# Patient Record
Sex: MALE | Race: OTHER | HISPANIC OR LATINO | Employment: OTHER | URBAN - METROPOLITAN AREA
[De-identification: names, ages, dates, MRNs, and addresses within clinical notes are randomized per-mention and may not be internally consistent; named-entity substitution may affect disease eponyms.]

---

## 2019-11-12 PROBLEM — E78.5 DYSLIPIDEMIA: Status: ACTIVE | Noted: 2019-11-12

## 2019-11-12 PROBLEM — J41.0 SMOKERS' COUGH: Status: ACTIVE | Noted: 2019-11-12

## 2019-11-12 PROBLEM — Z79.4 LONG-TERM INSULIN USE: Status: ACTIVE | Noted: 2019-11-12

## 2019-11-12 PROBLEM — K21.9 GASTROESOPHAGEAL REFLUX DISEASE WITHOUT ESOPHAGITIS: Status: ACTIVE | Noted: 2019-11-12

## 2019-11-12 PROBLEM — E11.42 DIABETIC POLYNEUROPATHY ASSOCIATED WITH TYPE 2 DIABETES MELLITUS: Status: ACTIVE | Noted: 2019-11-12

## 2019-11-12 PROBLEM — Z86.73 HISTORY OF CVA (CEREBROVASCULAR ACCIDENT): Status: ACTIVE | Noted: 2019-11-12

## 2019-12-04 ENCOUNTER — OFFICE VISIT (OUTPATIENT)
Dept: FAMILY MEDICINE | Facility: CLINIC | Age: 69
End: 2019-12-04
Payer: MEDICARE

## 2019-12-04 VITALS
HEART RATE: 68 BPM | WEIGHT: 196 LBS | DIASTOLIC BLOOD PRESSURE: 66 MMHG | BODY MASS INDEX: 28.06 KG/M2 | SYSTOLIC BLOOD PRESSURE: 134 MMHG | HEIGHT: 70 IN

## 2019-12-04 DIAGNOSIS — Z79.4 LONG-TERM INSULIN USE: ICD-10-CM

## 2019-12-04 DIAGNOSIS — E78.5 DYSLIPIDEMIA: ICD-10-CM

## 2019-12-04 DIAGNOSIS — K21.9 GASTROESOPHAGEAL REFLUX DISEASE WITHOUT ESOPHAGITIS: ICD-10-CM

## 2019-12-04 DIAGNOSIS — I10 ESSENTIAL HYPERTENSION: ICD-10-CM

## 2019-12-04 DIAGNOSIS — F17.210 CIGARETTE SMOKER: ICD-10-CM

## 2019-12-04 DIAGNOSIS — E11.42 DIABETIC POLYNEUROPATHY ASSOCIATED WITH TYPE 2 DIABETES MELLITUS: Primary | ICD-10-CM

## 2019-12-04 DIAGNOSIS — Z91.199 NONCOMPLIANCE: ICD-10-CM

## 2019-12-04 PROCEDURE — 99214 OFFICE O/P EST MOD 30 MIN: CPT | Mod: S$GLB,,, | Performed by: NURSE PRACTITIONER

## 2019-12-04 PROCEDURE — 1101F PR PT FALLS ASSESS DOC 0-1 FALLS W/OUT INJ PAST YR: ICD-10-PCS | Mod: S$GLB,,, | Performed by: NURSE PRACTITIONER

## 2019-12-04 PROCEDURE — 1159F MED LIST DOCD IN RCRD: CPT | Mod: S$GLB,,, | Performed by: NURSE PRACTITIONER

## 2019-12-04 PROCEDURE — 1101F PT FALLS ASSESS-DOCD LE1/YR: CPT | Mod: S$GLB,,, | Performed by: NURSE PRACTITIONER

## 2019-12-04 PROCEDURE — 1159F PR MEDICATION LIST DOCUMENTED IN MEDICAL RECORD: ICD-10-PCS | Mod: S$GLB,,, | Performed by: NURSE PRACTITIONER

## 2019-12-04 PROCEDURE — 99214 PR OFFICE/OUTPT VISIT, EST, LEVL IV, 30-39 MIN: ICD-10-PCS | Mod: S$GLB,,, | Performed by: NURSE PRACTITIONER

## 2019-12-04 RX ORDER — SIMVASTATIN 40 MG/1
1 TABLET, FILM COATED ORAL DAILY
Refills: 1 | COMMUNITY
Start: 2019-09-11 | End: 2019-12-04 | Stop reason: SDUPTHER

## 2019-12-04 RX ORDER — BLOOD-GLUCOSE CONTROL, NORMAL
EACH MISCELLANEOUS
Status: ON HOLD | COMMUNITY
Start: 2018-01-09 | End: 2023-01-24 | Stop reason: HOSPADM

## 2019-12-04 RX ORDER — INSULIN GLARGINE 100 [IU]/ML
10 INJECTION, SOLUTION SUBCUTANEOUS DAILY
COMMUNITY
Start: 2018-01-16 | End: 2019-12-04 | Stop reason: SDUPTHER

## 2019-12-04 RX ORDER — GLIMEPIRIDE 2 MG/1
1 TABLET ORAL DAILY
Refills: 1 | COMMUNITY
Start: 2019-09-21 | End: 2019-12-04 | Stop reason: SDUPTHER

## 2019-12-04 RX ORDER — INSULIN GLARGINE 100 [IU]/ML
10 INJECTION, SOLUTION SUBCUTANEOUS NIGHTLY
Qty: 1 BOX | Refills: 5 | Status: SHIPPED | OUTPATIENT
Start: 2019-12-04 | End: 2020-02-13 | Stop reason: ALTCHOICE

## 2019-12-04 RX ORDER — METOPROLOL TARTRATE 50 MG/1
1 TABLET ORAL 2 TIMES DAILY
Refills: 1 | COMMUNITY
Start: 2019-09-11 | End: 2019-12-04 | Stop reason: SDUPTHER

## 2019-12-04 RX ORDER — GABAPENTIN 100 MG/1
CAPSULE ORAL
Qty: 60 CAPSULE | Refills: 5 | Status: SHIPPED | OUTPATIENT
Start: 2019-12-04 | End: 2020-05-19 | Stop reason: SDUPTHER

## 2019-12-04 RX ORDER — LISINOPRIL 5 MG/1
1 TABLET ORAL DAILY
COMMUNITY
Start: 2018-01-09 | End: 2020-05-19 | Stop reason: SDUPTHER

## 2019-12-04 RX ORDER — ASPIRIN 81 MG/1
TABLET ORAL
COMMUNITY
Start: 2018-01-09 | End: 2020-05-19 | Stop reason: SDUPTHER

## 2019-12-04 RX ORDER — CLOPIDOGREL BISULFATE 75 MG/1
1 TABLET ORAL DAILY
Refills: 1 | COMMUNITY
Start: 2019-09-11 | End: 2020-02-13 | Stop reason: SDUPTHER

## 2019-12-04 RX ORDER — METFORMIN HYDROCHLORIDE 500 MG/1
2 TABLET, EXTENDED RELEASE ORAL 2 TIMES DAILY
COMMUNITY
End: 2020-02-18 | Stop reason: SINTOL

## 2019-12-04 RX ORDER — GLIMEPIRIDE 2 MG/1
2 TABLET ORAL DAILY
Qty: 90 TABLET | Refills: 1 | Status: SHIPPED | OUTPATIENT
Start: 2019-12-04 | End: 2020-05-19

## 2019-12-04 RX ORDER — SIMVASTATIN 40 MG/1
40 TABLET, FILM COATED ORAL DAILY
Qty: 90 TABLET | Refills: 1 | Status: SHIPPED | OUTPATIENT
Start: 2019-12-04 | End: 2019-12-06

## 2019-12-04 RX ORDER — OMEPRAZOLE 20 MG/1
1 CAPSULE, DELAYED RELEASE ORAL DAILY
Refills: 5 | COMMUNITY
Start: 2019-09-11 | End: 2020-05-19 | Stop reason: ALTCHOICE

## 2019-12-04 RX ORDER — METOPROLOL TARTRATE 50 MG/1
50 TABLET ORAL 2 TIMES DAILY
Qty: 180 TABLET | Refills: 1 | Status: SHIPPED | OUTPATIENT
Start: 2019-12-04 | End: 2020-05-19 | Stop reason: SDUPTHER

## 2019-12-04 NOTE — PROGRESS NOTES
"  SUBJECTIVE:    Patient ID: Vijay Nguyen is a 69 y.o. male.    Chief Complaint: Leg Pain (brought bottles// SW) and Edema (x1 month// SW)    Pt here for f/u- has missed 3 appts since July visit. Reports past couple months has noticed bilat feet are swollen- swelling seems reports worse first thing in am- will soak his feet in epsom salt which helps. Also c/oing of burning/tingling to bilat toes and bottom of both feet.  C/oing of bilat hip, lower back and shoulder pain  Reports doesn't check blood sugars at home. Takes the insulin approx every 3 days "when he feels his blood sugar goes up"      No visits with results within 6 Month(s) from this visit.   Latest known visit with results is:   No results found for any previous visit.       Past Medical History:   Diagnosis Date    Diabetic polyneuropathy associated with type 2 diabetes mellitus 11/12/2019    Dyslipidemia 11/12/2019    Gastroesophageal reflux disease without esophagitis 11/12/2019    History of CVA (cerebrovascular accident) 11/12/2019    Long-term insulin use 11/12/2019    Smokers' cough 11/12/2019     History reviewed. No pertinent surgical history.  History reviewed. No pertinent family history.    Marital Status: Single  Alcohol History:  reports that he drinks alcohol.  Tobacco History:  reports that he has been smoking. He has never used smokeless tobacco.  Drug History:  reports that he does not use drugs.    Review of patient's allergies indicates:  No Known Allergies    Current Outpatient Medications:     aspirin (ECOTRIN) 81 MG EC tablet, 1 tablet, Disp: , Rfl:     blood sugar diagnostic (TRUE METRIX GLUCOSE TEST STRIP) Strp, as directed, Disp: , Rfl:     clopidogrel (PLAVIX) 75 mg tablet, Take 1 tablet by mouth once daily., Disp: , Rfl: 1    glimepiride (AMARYL) 2 MG tablet, Take 1 tablet (2 mg total) by mouth once daily., Disp: 90 tablet, Rfl: 1    insulin (BASAGLAR KWIKPEN U-100 INSULIN) glargine 100 units/mL (3mL) SubQ pen, " "Inject 10 Units into the skin every evening., Disp: 1 Box, Rfl: 5    lancets (RELIAMED LANCET) 30 gauge Misc, as directed, Disp: , Rfl:     lisinopril (PRINIVIL,ZESTRIL) 5 MG tablet, Take 1 tablet by mouth once daily., Disp: , Rfl:     metFORMIN (GLUCOPHAGE-XR) 500 MG 24 hr tablet, Take 2 tablets by mouth 2 (two) times daily., Disp: , Rfl:     metoprolol tartrate (LOPRESSOR) 50 MG tablet, Take 1 tablet (50 mg total) by mouth 2 (two) times daily., Disp: 180 tablet, Rfl: 1    omeprazole (PRILOSEC) 20 MG capsule, Take 1 capsule by mouth once daily., Disp: , Rfl: 5    ranitidine (ZANTAC) 150 MG tablet, Take 1 tablet (150 mg total) by mouth nightly., Disp: 90 tablet, Rfl: 1    simvastatin (ZOCOR) 40 MG tablet, Take 1 tablet (40 mg total) by mouth once daily., Disp: 90 tablet, Rfl: 1    gabapentin (NEURONTIN) 100 MG capsule, 1-2 capsules at bedtime for neuropathy pain, Disp: 60 capsule, Rfl: 5    Review of Systems   Constitutional: Negative for chills and fever.   HENT: Negative for sore throat.    Respiratory: Negative for cough, shortness of breath and wheezing.    Cardiovascular: Positive for leg swelling. Negative for chest pain and palpitations.   Gastrointestinal: Negative for abdominal pain, constipation and diarrhea.   Genitourinary: Negative for dysuria and hematuria.   Musculoskeletal: Positive for arthralgias and back pain.   Skin: Negative for rash.   Neurological: Positive for numbness. Negative for dizziness and headaches.   Psychiatric/Behavioral: Negative for dysphoric mood.          Objective:      Vitals:    12/04/19 1036   BP: 134/66   Pulse: 68   Weight: 88.9 kg (196 lb)   Height: 5' 10" (1.778 m)     Physical Exam   Constitutional: He is oriented to person, place, and time. He appears well-developed and well-nourished.   HENT:   Head: Normocephalic and atraumatic.   Right Ear: Tympanic membrane and ear canal normal.   Left Ear: Tympanic membrane and ear canal normal.   Mouth/Throat: Mucous " membranes are normal. No posterior oropharyngeal erythema.   Neck: Neck supple. Carotid bruit is not present.   Cardiovascular: Normal rate and regular rhythm. Exam reveals no gallop and no friction rub.   No murmur heard.  Pulses:       Dorsalis pedis pulses are 2+ on the right side, and 2+ on the left side.   Pulmonary/Chest: Effort normal and breath sounds normal. No respiratory distress. He has no wheezes. He has no rales.   Abdominal: Soft. He exhibits no distension. There is no tenderness.   Musculoskeletal:        Right foot: There is no deformity.        Left foot: There is no deformity.   Feet:   Right Foot:   Protective Sensation: 5 sites tested. 5 sites sensed.   Skin Integrity: Negative for ulcer, skin breakdown, erythema or callus.   Left Foot:   Protective Sensation: 5 sites tested. 5 sites sensed.   Skin Integrity: Negative for ulcer, skin breakdown, erythema or callus.   Lymphadenopathy:     He has no cervical adenopathy.   Neurological: He is alert and oriented to person, place, and time. He has normal strength. Gait normal.   Skin: Skin is warm and dry. No rash noted.   Psychiatric: He has a normal mood and affect.   Nursing note and vitals reviewed.        Assessment:       1. Diabetic polyneuropathy associated with type 2 diabetes mellitus    2. Essential hypertension    3. Dyslipidemia    4. Gastroesophageal reflux disease without esophagitis    5. Long-term insulin use    6. Noncompliance    7. Cigarette smoker           Plan:       Diabetic polyneuropathy associated with type 2 diabetes mellitus  -     insulin (BASAGLAR KWIKPEN U-100 INSULIN) glargine 100 units/mL (3mL) SubQ pen; Inject 10 Units into the skin every evening.  Dispense: 1 Box; Refill: 5  -     Hemoglobin A1c; Future; Expected date: 12/04/2019  -     Comprehensive metabolic panel; Future; Expected date: 12/04/2019  -     gabapentin (NEURONTIN) 100 MG capsule; 1-2 capsules at bedtime for neuropathy pain  Dispense: 60 capsule;  Refill: 5  -patient to have A1c drawn on his way out today and will call with results-advised he needs to be monitoring his sugar and given increase in neuropathy symptoms suspect his blood sugar may be running higher so needs to be taking insulin nightly unless his blood sugars are less than 130.  Normal foot exam today without edema or ulceration with normal pulses.     Essential hypertension  -     metoprolol tartrate (LOPRESSOR) 50 MG tablet; Take 1 tablet (50 mg total) by mouth 2 (two) times daily.  Dispense: 180 tablet; Refill: 1  -blood pressure stable. Patient advised to watch his salt intake and elevate feet if swollen      Dyslipidemia  -     simvastatin (ZOCOR) 40 MG tablet; Take 1 tablet (40 mg total) by mouth once daily.  Dispense: 90 tablet; Refill: 1  -     Lipid panel; Future; Expected date: 12/04/2019  -patient admits has not been taking statin regularly, to have labs drawn today    Gastroesophageal reflux disease without esophagitis  -     ranitidine (ZANTAC) 150 MG tablet; Take 1 tablet (150 mg total) by mouth nightly.  Dispense: 90 tablet; Refill: 1  -stable    Long-term insulin use  -     glimepiride (AMARYL) 2 MG tablet; Take 1 tablet (2 mg total) by mouth once daily.  Dispense: 90 tablet; Refill: 1    Noncompliance  -stressed to patient the importance of taking his medication regularly as well as attending regular appointments    Cigarette smoker  -smoking cessation counseling offered and strongly encouraged to quit.  Patient up-to-date with low-dose CT of chest    Follow up in about 8 weeks (around 1/29/2020) for Diabetes, HTN; labwork today.        12/4/2019 Trista Alcala NP

## 2019-12-05 LAB
ALBUMIN SERPL-MCNC: 4.2 G/DL (ref 3.6–5.1)
ALBUMIN/GLOB SERPL: 1.4 (CALC) (ref 1–2.5)
ALP SERPL-CCNC: 64 U/L (ref 40–115)
ALT SERPL-CCNC: 21 U/L (ref 9–46)
AST SERPL-CCNC: 20 U/L (ref 10–35)
BILIRUB SERPL-MCNC: 0.4 MG/DL (ref 0.2–1.2)
BUN SERPL-MCNC: 11 MG/DL (ref 7–25)
BUN/CREAT SERPL: ABNORMAL (CALC) (ref 6–22)
CALCIUM SERPL-MCNC: 9.4 MG/DL (ref 8.6–10.3)
CHLORIDE SERPL-SCNC: 102 MMOL/L (ref 98–110)
CHOLEST SERPL-MCNC: 157 MG/DL
CHOLEST/HDLC SERPL: 4.6 (CALC)
CO2 SERPL-SCNC: 28 MMOL/L (ref 20–32)
CREAT SERPL-MCNC: 1 MG/DL (ref 0.7–1.25)
GFRSERPLBLD MDRD-ARVRAT: 76 ML/MIN/1.73M2
GLOBULIN SER CALC-MCNC: 3 G/DL (CALC) (ref 1.9–3.7)
GLUCOSE SERPL-MCNC: 111 MG/DL (ref 65–99)
HBA1C MFR BLD: 6.3 % OF TOTAL HGB
HDLC SERPL-MCNC: 34 MG/DL
LDLC SERPL CALC-MCNC: 99 MG/DL (CALC)
NONHDLC SERPL-MCNC: 123 MG/DL (CALC)
POTASSIUM SERPL-SCNC: 4.5 MMOL/L (ref 3.5–5.3)
PROT SERPL-MCNC: 7.2 G/DL (ref 6.1–8.1)
SODIUM SERPL-SCNC: 136 MMOL/L (ref 135–146)
TRIGL SERPL-MCNC: 137 MG/DL

## 2019-12-06 ENCOUNTER — TELEPHONE (OUTPATIENT)
Dept: FAMILY MEDICINE | Facility: CLINIC | Age: 69
End: 2019-12-06

## 2019-12-06 DIAGNOSIS — E78.5 DYSLIPIDEMIA: Primary | ICD-10-CM

## 2019-12-06 DIAGNOSIS — Z79.899 ENCOUNTER FOR LONG-TERM (CURRENT) USE OF OTHER MEDICATIONS: ICD-10-CM

## 2019-12-06 RX ORDER — ATORVASTATIN CALCIUM 40 MG/1
40 TABLET, FILM COATED ORAL DAILY
Qty: 90 TABLET | Refills: 1 | Status: SHIPPED | OUTPATIENT
Start: 2019-12-06 | End: 2020-05-19 | Stop reason: SDUPTHER

## 2019-12-06 NOTE — TELEPHONE ENCOUNTER
Spoke to patient and his daughter, April. She understood the message.  Remind Me created  Canceled Simvastatin and sent in Atorvastatin to be signed by Trista Gallardo to [facility]

## 2019-12-06 NOTE — TELEPHONE ENCOUNTER
----- Message from Trista Alcala NP sent at 12/5/2019  9:11 PM CST -----  Please call pt and let him know his blood sugar/diabetes test is stable. Kidney and liver function within normal range. His bad cholesterol (LDL) is high at 99, should be less than 70 and given Diabetes and increased risk for heart disease recommend we switch him to atorvastatin 40mg daily. Repeat CMP, lipids, TSH, CBC, PSA, micro in 6 months.

## 2019-12-06 NOTE — PROGRESS NOTES
Please call pt and let him know his blood sugar/diabetes test is stable. Kidney and liver function within normal range. His bad cholesterol (LDL) is high at 99, should be less than 70 and given Diabetes and increased risk for heart disease recommend we switch him to atorvastatin 40mg daily. Repeat CMP, lipids, TSH, CBC, PSA, micro in 6 months.

## 2020-01-23 ENCOUNTER — TELEPHONE (OUTPATIENT)
Dept: FAMILY MEDICINE | Facility: CLINIC | Age: 70
End: 2020-01-23

## 2020-01-23 DIAGNOSIS — F17.210 CIGARETTE SMOKER: Primary | ICD-10-CM

## 2020-01-23 DIAGNOSIS — Z12.9 SCREENING FOR CANCER: ICD-10-CM

## 2020-01-23 NOTE — TELEPHONE ENCOUNTER
From [facility] action: Per Trista, previous CT scan January 2019 shows emphysema-no pulmonary nodules seen and mild calcium around coronary arteries-strongly encourage him to quite smoking-repeat LDCT in 12 months.     Spoke to patient that this is due. Order pended. Patient aware that [facility] will call to schedule. Remind me created for f/u.    While I was on the phone with the patient he mentioned to me that he was dizzy and light-headed. States that he hasn't eaten anything today and just now is eating a hot dog. I asked if he wanted me to send Trista a message on this and he stated not at this time. Said he will call back if he doesn't feel better after he eats. I offered 2 times to send message and patient declined.

## 2020-01-24 ENCOUNTER — OFFICE VISIT (OUTPATIENT)
Dept: OPHTHALMOLOGY | Facility: CLINIC | Age: 70
End: 2020-01-24
Payer: MEDICARE

## 2020-01-24 DIAGNOSIS — R51.9 NEW ONSET OF HEADACHES AFTER AGE 50: Primary | ICD-10-CM

## 2020-01-24 PROCEDURE — 99999 PR PBB SHADOW E&M-EST. PATIENT-LVL III: CPT | Mod: PBBFAC,,, | Performed by: OPHTHALMOLOGY

## 2020-01-24 PROCEDURE — 92004 COMPRE OPH EXAM NEW PT 1/>: CPT | Mod: S$GLB,,, | Performed by: OPHTHALMOLOGY

## 2020-01-24 PROCEDURE — 92004 PR EYE EXAM, NEW PATIENT,COMPREHESV: ICD-10-PCS | Mod: S$GLB,,, | Performed by: OPHTHALMOLOGY

## 2020-01-24 PROCEDURE — 99999 PR PBB SHADOW E&M-EST. PATIENT-LVL III: ICD-10-PCS | Mod: PBBFAC,,, | Performed by: OPHTHALMOLOGY

## 2020-01-24 NOTE — PROGRESS NOTES
HPI     Pt referred for possible GCA, per Dr Vicente.  Pt states OD is on fire at this moment. Proparacaine given. He states the   eye has been burning on and off for the past five days along with a   headache like feeling that starts at the back of his head and goes all   down the side of his face and along his jaw.Pt was prescribed Tramadol for   pain but has not picked up the prescription.    I have personally interviewed the patient, reviewed the history and   examined the patient and agree with the technician's exam.    ESR elevated but CRP and platelets are normal.    Last edited by Dain Liu MD on 1/24/2020  2:58 PM. (History)            Assessment /Plan     For exam results, see Encounter Report.    New onset of headaches after age 50      The headache pattern does not really fit giant cell arteritis. Although the ESR was elevated, the CRP and platelet count were normal.Because the issue was raised in the ED, I will order a rheumatology referral. He will return to me as needed,.

## 2020-01-27 ENCOUNTER — OFFICE VISIT (OUTPATIENT)
Dept: FAMILY MEDICINE | Facility: CLINIC | Age: 70
End: 2020-01-27
Payer: MEDICARE

## 2020-01-27 ENCOUNTER — TELEPHONE (OUTPATIENT)
Dept: FAMILY MEDICINE | Facility: CLINIC | Age: 70
End: 2020-01-27

## 2020-01-27 VITALS
HEART RATE: 68 BPM | SYSTOLIC BLOOD PRESSURE: 138 MMHG | WEIGHT: 195 LBS | DIASTOLIC BLOOD PRESSURE: 60 MMHG | HEIGHT: 70 IN | BODY MASS INDEX: 27.92 KG/M2

## 2020-01-27 DIAGNOSIS — R42 DIZZINESS: ICD-10-CM

## 2020-01-27 DIAGNOSIS — Z23 NEED FOR VACCINATION: ICD-10-CM

## 2020-01-27 DIAGNOSIS — E11.9 TYPE 2 DIABETES MELLITUS WITHOUT COMPLICATION, WITHOUT LONG-TERM CURRENT USE OF INSULIN: ICD-10-CM

## 2020-01-27 DIAGNOSIS — Z12.11 COLON CANCER SCREENING: Primary | ICD-10-CM

## 2020-01-27 PROCEDURE — 3075F PR MOST RECENT SYSTOLIC BLOOD PRESS GE 130-139MM HG: ICD-10-PCS | Mod: S$GLB,,, | Performed by: NURSE PRACTITIONER

## 2020-01-27 PROCEDURE — 99213 OFFICE O/P EST LOW 20 MIN: CPT | Mod: 25,S$GLB,, | Performed by: NURSE PRACTITIONER

## 2020-01-27 PROCEDURE — 90670 PNEUMOCOCCAL CONJUGATE VACCINE 13-VALENT LESS THAN 5YO & GREATER THAN: ICD-10-PCS | Mod: S$GLB,,, | Performed by: NURSE PRACTITIONER

## 2020-01-27 PROCEDURE — 3044F HG A1C LEVEL LT 7.0%: CPT | Mod: S$GLB,,, | Performed by: NURSE PRACTITIONER

## 2020-01-27 PROCEDURE — 1101F PR PT FALLS ASSESS DOC 0-1 FALLS W/OUT INJ PAST YR: ICD-10-PCS | Mod: S$GLB,,, | Performed by: NURSE PRACTITIONER

## 2020-01-27 PROCEDURE — 1101F PT FALLS ASSESS-DOCD LE1/YR: CPT | Mod: S$GLB,,, | Performed by: NURSE PRACTITIONER

## 2020-01-27 PROCEDURE — 90670 PCV13 VACCINE IM: CPT | Mod: S$GLB,,, | Performed by: NURSE PRACTITIONER

## 2020-01-27 PROCEDURE — 1159F PR MEDICATION LIST DOCUMENTED IN MEDICAL RECORD: ICD-10-PCS | Mod: S$GLB,,, | Performed by: NURSE PRACTITIONER

## 2020-01-27 PROCEDURE — 3044F PR MOST RECENT HEMOGLOBIN A1C LEVEL <7.0%: ICD-10-PCS | Mod: S$GLB,,, | Performed by: NURSE PRACTITIONER

## 2020-01-27 PROCEDURE — G0009 ADMIN PNEUMOCOCCAL VACCINE: HCPCS | Mod: S$GLB,,, | Performed by: NURSE PRACTITIONER

## 2020-01-27 PROCEDURE — G0009 PNEUMOCOCCAL CONJUGATE VACCINE 13-VALENT LESS THAN 5YO & GREATER THAN: ICD-10-PCS | Mod: S$GLB,,, | Performed by: NURSE PRACTITIONER

## 2020-01-27 PROCEDURE — 3075F SYST BP GE 130 - 139MM HG: CPT | Mod: S$GLB,,, | Performed by: NURSE PRACTITIONER

## 2020-01-27 PROCEDURE — 1159F MED LIST DOCD IN RCRD: CPT | Mod: S$GLB,,, | Performed by: NURSE PRACTITIONER

## 2020-01-27 PROCEDURE — 3078F DIAST BP <80 MM HG: CPT | Mod: S$GLB,,, | Performed by: NURSE PRACTITIONER

## 2020-01-27 PROCEDURE — 99213 PR OFFICE/OUTPT VISIT, EST, LEVL III, 20-29 MIN: ICD-10-PCS | Mod: 25,S$GLB,, | Performed by: NURSE PRACTITIONER

## 2020-01-27 PROCEDURE — 3078F PR MOST RECENT DIASTOLIC BLOOD PRESSURE < 80 MM HG: ICD-10-PCS | Mod: S$GLB,,, | Performed by: NURSE PRACTITIONER

## 2020-01-27 NOTE — TELEPHONE ENCOUNTER
----- Message from Ruth Barber sent at 1/27/2020 11:51 AM CST -----  Patient present in the office . He has an appt. Wednesday with denise but he wants to know if he can be checked for maybe his BP he said he is dizzy and when he is driving he Is seeing double

## 2020-01-27 NOTE — PROGRESS NOTES
SUBJECTIVE:    Patient ID: Vijay Nguyen is a 69 y.o. male.    Chief Complaint: Dizziness (double vision since this morning, no bottles// SW); Follow-up (ref fpr Gastro put in// SW); and Injections (wants PNA 13// SW)    Pt presents today after he experienced dizziness and double vision this morning. Pt was recently seen in ER for extreme headache and change in vision. CVA was ruled out and pt sent to ophthalmology where giant cell arteritis was ruled out but pt was referred to Rheumatology d/t elevated sed rate. He is a diabetic on glimepiride and metformin. Has Basaglar but states he rarely uses it. Does not check his blood sugar at home. States he has machines but they do not work. He has had episodes like this every so often.         Admission on 01/23/2020, Discharged on 01/24/2020   Component Date Value Ref Range Status    POCT Glucose 01/23/2020 68* 70 - 110 mg/dL Final    WBC 01/23/2020 9.80  3.90 - 12.70 K/uL Final    RBC 01/23/2020 4.50* 4.60 - 6.20 M/uL Final    Hemoglobin 01/23/2020 13.2* 14.0 - 18.0 g/dL Final    Hematocrit 01/23/2020 39.9* 40.0 - 54.0 % Final    Mean Corpuscular Volume 01/23/2020 89  82 - 98 fL Final    Mean Corpuscular Hemoglobin 01/23/2020 29.4  27.0 - 31.0 pg Final    Mean Corpuscular Hemoglobin Conc 01/23/2020 33.2  32.0 - 36.0 g/dL Final    RDW 01/23/2020 14.4  11.5 - 14.5 % Final    Platelets 01/23/2020 216  150 - 350 K/uL Final    MPV 01/23/2020 9.6  9.2 - 12.9 fL Final    Gran # (ANC) 01/23/2020 7.0  1.8 - 7.7 K/uL Final    Lymph # 01/23/2020 1.9  1.0 - 4.8 K/uL Final    Mono # 01/23/2020 0.6  0.3 - 1.0 K/uL Final    Eos # 01/23/2020 0.2  0.0 - 0.5 K/uL Final    Baso # 01/23/2020 0.00  0.00 - 0.20 K/uL Final    Gran% 01/23/2020 71.3  38.0 - 73.0 % Final    Lymph% 01/23/2020 19.9  18.0 - 48.0 % Final    Mono% 01/23/2020 6.4  4.0 - 15.0 % Final    Eosinophil% 01/23/2020 1.9  0.0 - 8.0 % Final    Basophil% 01/23/2020 0.5  0.0 - 1.9 % Final    Differential  Method 01/23/2020 Automated   Final    Sodium 01/23/2020 136  136 - 145 mmol/L Final    Potassium 01/23/2020 4.0  3.5 - 5.1 mmol/L Final    Chloride 01/23/2020 101  101 - 111 mmol/L Final    CO2 01/23/2020 25  23 - 29 mmol/L Final    Glucose 01/23/2020 92  74 - 118 mg/dL Final    BUN, Bld 01/23/2020 13  8 - 23 mg/dL Final    Creatinine 01/23/2020 1.1  0.5 - 1.4 mg/dL Final    Calcium 01/23/2020 9.5  8.6 - 10.0 mg/dL Final    Total Protein 01/23/2020 7.6  6.0 - 8.4 g/dL Final    Albumin 01/23/2020 4.1  3.5 - 5.2 g/dL Final    Total Bilirubin 01/23/2020 0.3  0.3 - 1.2 mg/dL Final    Alkaline Phosphatase 01/23/2020 56  38 - 126 U/L Final    AST 01/23/2020 22  15 - 41 U/L Final    ALT 01/23/2020 24  17 - 63 U/L Final    Anion Gap 01/23/2020 10  8 - 16 mmol/L Final    eGFR if African American 01/23/2020 >60.0  >60 mL/min/1.73 m^2 Final    eGFR if non African American 01/23/2020 >60.0  >60 mL/min/1.73 m^2 Final    Prothrombin Time 01/23/2020 9.8  9.0 - 12.5 sec Final    INR 01/23/2020 0.9  0.8 - 1.2 Final    TSH 01/23/2020 1.63  0.45 - 5.33 uIU/mL Final    Sed Rate 01/23/2020 56* 0 - 10 mm/Hr Final    CRP 01/23/2020 <0.50  0.00 - 1.00 mg/dL Final   Office Visit on 12/04/2019   Component Date Value Ref Range Status    Hemoglobin A1C 12/04/2019 6.3* <5.7 % of total Hgb Final    Glucose 12/04/2019 111* 65 - 99 mg/dL Final    BUN, Bld 12/04/2019 11  7 - 25 mg/dL Final    Creatinine 12/04/2019 1.00  0.70 - 1.25 mg/dL Final    eGFR if non African American 12/04/2019 76  > OR = 60 mL/min/1.73m2 Final    eGFR if  12/04/2019 89  > OR = 60 mL/min/1.73m2 Final    BUN/Creatinine Ratio 12/04/2019 NOT APPLICABLE  6 - 22 (calc) Final    Sodium 12/04/2019 136  135 - 146 mmol/L Final    Potassium 12/04/2019 4.5  3.5 - 5.3 mmol/L Final    Chloride 12/04/2019 102  98 - 110 mmol/L Final    CO2 12/04/2019 28  20 - 32 mmol/L Final    Calcium 12/04/2019 9.4  8.6 - 10.3 mg/dL Final    Total  Protein 12/04/2019 7.2  6.1 - 8.1 g/dL Final    Albumin 12/04/2019 4.2  3.6 - 5.1 g/dL Final    Globulin, Total 12/04/2019 3.0  1.9 - 3.7 g/dL (calc) Final    Albumin/Globulin Ratio 12/04/2019 1.4  1.0 - 2.5 (calc) Final    Total Bilirubin 12/04/2019 0.4  0.2 - 1.2 mg/dL Final    Alkaline Phosphatase 12/04/2019 64  40 - 115 U/L Final    AST 12/04/2019 20  10 - 35 U/L Final    ALT 12/04/2019 21  9 - 46 U/L Final    Cholesterol 12/04/2019 157  <200 mg/dL Final    HDL 12/04/2019 34* >40 mg/dL Final    Triglycerides 12/04/2019 137  <150 mg/dL Final    LDL Cholesterol 12/04/2019 99  mg/dL (calc) Final    Hdl/Cholesterol Ratio 12/04/2019 4.6  <5.0 (calc) Final    Non HDL Chol. (LDL+VLDL) 12/04/2019 123  <130 mg/dL (calc) Final       Past Medical History:   Diagnosis Date    Diabetic polyneuropathy associated with type 2 diabetes mellitus 11/12/2019    Dyslipidemia 11/12/2019    Gastroesophageal reflux disease without esophagitis 11/12/2019    History of CVA (cerebrovascular accident) 11/12/2019    Long-term insulin use 11/12/2019    Smokers' cough 11/12/2019     History reviewed. No pertinent surgical history.  History reviewed. No pertinent family history.    Marital Status: Single  Alcohol History:  reports that he drinks alcohol.  Tobacco History:  reports that he has been smoking. He has never used smokeless tobacco.  Drug History:  reports that he does not use drugs.    Review of patient's allergies indicates:  No Known Allergies    Current Outpatient Medications:     aspirin (ECOTRIN) 81 MG EC tablet, 1 tablet, Disp: , Rfl:     atorvastatin (LIPITOR) 40 MG tablet, Take 1 tablet (40 mg total) by mouth once daily., Disp: 90 tablet, Rfl: 1    blood sugar diagnostic (TRUE METRIX GLUCOSE TEST STRIP) Strp, as directed, Disp: , Rfl:     clopidogrel (PLAVIX) 75 mg tablet, Take 1 tablet by mouth once daily., Disp: , Rfl: 1    gabapentin (NEURONTIN) 100 MG capsule, 1-2 capsules at bedtime for  "neuropathy pain, Disp: 60 capsule, Rfl: 5    glimepiride (AMARYL) 2 MG tablet, Take 1 tablet (2 mg total) by mouth once daily., Disp: 90 tablet, Rfl: 1    insulin (BASAGLAR KWIKPEN U-100 INSULIN) glargine 100 units/mL (3mL) SubQ pen, Inject 10 Units into the skin every evening., Disp: 1 Box, Rfl: 5    lancets (RELIAMED LANCET) 30 gauge Misc, as directed, Disp: , Rfl:     lisinopril (PRINIVIL,ZESTRIL) 5 MG tablet, Take 1 tablet by mouth once daily., Disp: , Rfl:     metFORMIN (GLUCOPHAGE-XR) 500 MG 24 hr tablet, Take 2 tablets by mouth 2 (two) times daily., Disp: , Rfl:     metoprolol tartrate (LOPRESSOR) 50 MG tablet, Take 1 tablet (50 mg total) by mouth 2 (two) times daily., Disp: 180 tablet, Rfl: 1    omeprazole (PRILOSEC) 20 MG capsule, Take 1 capsule by mouth once daily., Disp: , Rfl: 5    ranitidine (ZANTAC) 150 MG tablet, Take 1 tablet (150 mg total) by mouth nightly., Disp: 90 tablet, Rfl: 1    traMADol (ULTRAM) 50 mg tablet, Take 1 tablet (50 mg total) by mouth every 6 (six) hours as needed for Pain., Disp: 12 tablet, Rfl: 0    Review of Systems   Constitutional: Positive for diaphoresis. Negative for chills and fever.   HENT: Negative.    Eyes: Positive for visual disturbance (some diplopia. Has resolved.).   Respiratory: Negative.    Cardiovascular: Negative.  Negative for chest pain and palpitations.   Gastrointestinal: Negative.  Negative for abdominal distention, constipation and nausea.   Genitourinary: Negative.    Musculoskeletal: Negative.    Neurological: Positive for dizziness (has resolved) and light-headedness. Negative for weakness and headaches.   Psychiatric/Behavioral: Negative.           Objective:      Vitals:    01/27/20 1220 01/27/20 1224   BP: (!) 168/70 138/60   Pulse: 68    Weight: 88.5 kg (195 lb)    Height: 5' 10" (1.778 m)      Body mass index is 27.98 kg/m².  Physical Exam   Constitutional: He appears well-developed and well-nourished. No distress.   HENT:   Head: " Normocephalic.   Eyes: Pupils are equal, round, and reactive to light.   Neck: Normal range of motion.   Cardiovascular: Normal rate, regular rhythm and normal heart sounds.   Pulmonary/Chest: Effort normal and breath sounds normal. No respiratory distress.   Abdominal: Soft. There is no tenderness.   Musculoskeletal: Normal range of motion.   Neurological: He is alert.   Skin: Skin is warm. Capillary refill takes less than 2 seconds.   Psychiatric: He has a normal mood and affect.         Assessment:       1. Colon cancer screening    2. Need for vaccination    3. Type 2 diabetes mellitus without complication, without long-term current use of insulin    4. Dizziness         Plan:       Colon cancer screening  -     Ambulatory referral to Gastroenterology    Need for vaccination  -     Pneumococcal Conjugate Vaccine (13 Valent) (IM)    Type 2 diabetes mellitus without complication, without long-term current use of insulin  - CBG in office was 68. Concerned he may have had a hypoglycemic episode. Encourage patient to make sure he eats an appropriate meal before taking his medications. Should also check CBG when having gone of these episodes. Gave him a new meter in office.    Dizziness      Follow up in about 2 weeks (around 2/10/2020) for with Olga

## 2020-02-13 ENCOUNTER — OFFICE VISIT (OUTPATIENT)
Dept: FAMILY MEDICINE | Facility: CLINIC | Age: 70
End: 2020-02-13
Payer: MEDICARE

## 2020-02-13 VITALS
WEIGHT: 196 LBS | DIASTOLIC BLOOD PRESSURE: 62 MMHG | HEIGHT: 70 IN | HEART RATE: 60 BPM | BODY MASS INDEX: 28.06 KG/M2 | SYSTOLIC BLOOD PRESSURE: 126 MMHG

## 2020-02-13 DIAGNOSIS — Z86.73 HISTORY OF CVA (CEREBROVASCULAR ACCIDENT): ICD-10-CM

## 2020-02-13 DIAGNOSIS — J41.0 SMOKERS' COUGH: ICD-10-CM

## 2020-02-13 DIAGNOSIS — E78.5 DYSLIPIDEMIA: ICD-10-CM

## 2020-02-13 DIAGNOSIS — Z12.5 PROSTATE CANCER SCREENING: ICD-10-CM

## 2020-02-13 DIAGNOSIS — E11.42 DIABETIC POLYNEUROPATHY ASSOCIATED WITH TYPE 2 DIABETES MELLITUS: Primary | ICD-10-CM

## 2020-02-13 PROCEDURE — 3074F SYST BP LT 130 MM HG: CPT | Mod: S$GLB,,, | Performed by: NURSE PRACTITIONER

## 2020-02-13 PROCEDURE — 3044F HG A1C LEVEL LT 7.0%: CPT | Mod: S$GLB,,, | Performed by: NURSE PRACTITIONER

## 2020-02-13 PROCEDURE — 1159F MED LIST DOCD IN RCRD: CPT | Mod: S$GLB,,, | Performed by: NURSE PRACTITIONER

## 2020-02-13 PROCEDURE — 99214 PR OFFICE/OUTPT VISIT, EST, LEVL IV, 30-39 MIN: ICD-10-PCS | Mod: S$GLB,,, | Performed by: NURSE PRACTITIONER

## 2020-02-13 PROCEDURE — 99214 OFFICE O/P EST MOD 30 MIN: CPT | Mod: S$GLB,,, | Performed by: NURSE PRACTITIONER

## 2020-02-13 PROCEDURE — 3074F PR MOST RECENT SYSTOLIC BLOOD PRESSURE < 130 MM HG: ICD-10-PCS | Mod: S$GLB,,, | Performed by: NURSE PRACTITIONER

## 2020-02-13 PROCEDURE — 3078F DIAST BP <80 MM HG: CPT | Mod: S$GLB,,, | Performed by: NURSE PRACTITIONER

## 2020-02-13 PROCEDURE — 1101F PT FALLS ASSESS-DOCD LE1/YR: CPT | Mod: S$GLB,,, | Performed by: NURSE PRACTITIONER

## 2020-02-13 PROCEDURE — 1159F PR MEDICATION LIST DOCUMENTED IN MEDICAL RECORD: ICD-10-PCS | Mod: S$GLB,,, | Performed by: NURSE PRACTITIONER

## 2020-02-13 PROCEDURE — 1101F PR PT FALLS ASSESS DOC 0-1 FALLS W/OUT INJ PAST YR: ICD-10-PCS | Mod: S$GLB,,, | Performed by: NURSE PRACTITIONER

## 2020-02-13 PROCEDURE — 3078F PR MOST RECENT DIASTOLIC BLOOD PRESSURE < 80 MM HG: ICD-10-PCS | Mod: S$GLB,,, | Performed by: NURSE PRACTITIONER

## 2020-02-13 PROCEDURE — 3044F PR MOST RECENT HEMOGLOBIN A1C LEVEL <7.0%: ICD-10-PCS | Mod: S$GLB,,, | Performed by: NURSE PRACTITIONER

## 2020-02-13 RX ORDER — CLOPIDOGREL BISULFATE 75 MG/1
75 TABLET ORAL DAILY
Qty: 90 TABLET | Refills: 1 | Status: SHIPPED | OUTPATIENT
Start: 2020-02-13 | End: 2020-05-19 | Stop reason: SDUPTHER

## 2020-02-13 NOTE — PROGRESS NOTES
"  SUBJECTIVE:    Patient ID: Vijay Nguyen is a 70 y.o. male.    Chief Complaint: Follow-up (dizziness, brought bottles// SW)    Pt here for 2 week f/u- seen at last visit with c/o's of dizziness and blurred vision. At that time sugar was 68 so advised to monitor blood sugars. Pt returns today, extremely poor historian, and reports he hasn't had any further episodes of dizziness/vision changes- he blames all his issues on a bad tooth which was recently pulled. States he quit taking metformin a few weeks ago "because it gives you cancer". Taking glimeperide daily and states will take insulin 10units "when he feels his sugar is up"- hasn't been checking his sugar at home.        Admission on 01/23/2020, Discharged on 01/24/2020   Component Date Value Ref Range Status    POCT Glucose 01/23/2020 68* 70 - 110 mg/dL Final    WBC 01/23/2020 9.80  3.90 - 12.70 K/uL Final    RBC 01/23/2020 4.50* 4.60 - 6.20 M/uL Final    Hemoglobin 01/23/2020 13.2* 14.0 - 18.0 g/dL Final    Hematocrit 01/23/2020 39.9* 40.0 - 54.0 % Final    Mean Corpuscular Volume 01/23/2020 89  82 - 98 fL Final    Mean Corpuscular Hemoglobin 01/23/2020 29.4  27.0 - 31.0 pg Final    Mean Corpuscular Hemoglobin Conc 01/23/2020 33.2  32.0 - 36.0 g/dL Final    RDW 01/23/2020 14.4  11.5 - 14.5 % Final    Platelets 01/23/2020 216  150 - 350 K/uL Final    MPV 01/23/2020 9.6  9.2 - 12.9 fL Final    Gran # (ANC) 01/23/2020 7.0  1.8 - 7.7 K/uL Final    Lymph # 01/23/2020 1.9  1.0 - 4.8 K/uL Final    Mono # 01/23/2020 0.6  0.3 - 1.0 K/uL Final    Eos # 01/23/2020 0.2  0.0 - 0.5 K/uL Final    Baso # 01/23/2020 0.00  0.00 - 0.20 K/uL Final    Gran% 01/23/2020 71.3  38.0 - 73.0 % Final    Lymph% 01/23/2020 19.9  18.0 - 48.0 % Final    Mono% 01/23/2020 6.4  4.0 - 15.0 % Final    Eosinophil% 01/23/2020 1.9  0.0 - 8.0 % Final    Basophil% 01/23/2020 0.5  0.0 - 1.9 % Final    Differential Method 01/23/2020 Automated   Final    Sodium 01/23/2020 136  " 136 - 145 mmol/L Final    Potassium 01/23/2020 4.0  3.5 - 5.1 mmol/L Final    Chloride 01/23/2020 101  101 - 111 mmol/L Final    CO2 01/23/2020 25  23 - 29 mmol/L Final    Glucose 01/23/2020 92  74 - 118 mg/dL Final    BUN, Bld 01/23/2020 13  8 - 23 mg/dL Final    Creatinine 01/23/2020 1.1  0.5 - 1.4 mg/dL Final    Calcium 01/23/2020 9.5  8.6 - 10.0 mg/dL Final    Total Protein 01/23/2020 7.6  6.0 - 8.4 g/dL Final    Albumin 01/23/2020 4.1  3.5 - 5.2 g/dL Final    Total Bilirubin 01/23/2020 0.3  0.3 - 1.2 mg/dL Final    Alkaline Phosphatase 01/23/2020 56  38 - 126 U/L Final    AST 01/23/2020 22  15 - 41 U/L Final    ALT 01/23/2020 24  17 - 63 U/L Final    Anion Gap 01/23/2020 10  8 - 16 mmol/L Final    eGFR if African American 01/23/2020 >60.0  >60 mL/min/1.73 m^2 Final    eGFR if non African American 01/23/2020 >60.0  >60 mL/min/1.73 m^2 Final    Prothrombin Time 01/23/2020 9.8  9.0 - 12.5 sec Final    INR 01/23/2020 0.9  0.8 - 1.2 Final    TSH 01/23/2020 1.63  0.45 - 5.33 uIU/mL Final    Sed Rate 01/23/2020 56* 0 - 10 mm/Hr Final    CRP 01/23/2020 <0.50  0.00 - 1.00 mg/dL Final   Office Visit on 12/04/2019   Component Date Value Ref Range Status    Hemoglobin A1C 12/04/2019 6.3* <5.7 % of total Hgb Final    Glucose 12/04/2019 111* 65 - 99 mg/dL Final    BUN, Bld 12/04/2019 11  7 - 25 mg/dL Final    Creatinine 12/04/2019 1.00  0.70 - 1.25 mg/dL Final    eGFR if non African American 12/04/2019 76  > OR = 60 mL/min/1.73m2 Final    eGFR if  12/04/2019 89  > OR = 60 mL/min/1.73m2 Final    BUN/Creatinine Ratio 12/04/2019 NOT APPLICABLE  6 - 22 (calc) Final    Sodium 12/04/2019 136  135 - 146 mmol/L Final    Potassium 12/04/2019 4.5  3.5 - 5.3 mmol/L Final    Chloride 12/04/2019 102  98 - 110 mmol/L Final    CO2 12/04/2019 28  20 - 32 mmol/L Final    Calcium 12/04/2019 9.4  8.6 - 10.3 mg/dL Final    Total Protein 12/04/2019 7.2  6.1 - 8.1 g/dL Final    Albumin 12/04/2019  4.2  3.6 - 5.1 g/dL Final    Globulin, Total 12/04/2019 3.0  1.9 - 3.7 g/dL (calc) Final    Albumin/Globulin Ratio 12/04/2019 1.4  1.0 - 2.5 (calc) Final    Total Bilirubin 12/04/2019 0.4  0.2 - 1.2 mg/dL Final    Alkaline Phosphatase 12/04/2019 64  40 - 115 U/L Final    AST 12/04/2019 20  10 - 35 U/L Final    ALT 12/04/2019 21  9 - 46 U/L Final    Cholesterol 12/04/2019 157  <200 mg/dL Final    HDL 12/04/2019 34* >40 mg/dL Final    Triglycerides 12/04/2019 137  <150 mg/dL Final    LDL Cholesterol 12/04/2019 99  mg/dL (calc) Final    Hdl/Cholesterol Ratio 12/04/2019 4.6  <5.0 (calc) Final    Non HDL Chol. (LDL+VLDL) 12/04/2019 123  <130 mg/dL (calc) Final       Past Medical History:   Diagnosis Date    Diabetic polyneuropathy associated with type 2 diabetes mellitus 11/12/2019    Dyslipidemia 11/12/2019    Gastroesophageal reflux disease without esophagitis 11/12/2019    History of CVA (cerebrovascular accident) 11/12/2019    Long-term insulin use 11/12/2019    Smokers' cough 11/12/2019     History reviewed. No pertinent surgical history.  History reviewed. No pertinent family history.    Marital Status: Single  Alcohol History:  reports that he drinks alcohol.  Tobacco History:  reports that he has been smoking. He has been smoking about 0.25 packs per day. He has never used smokeless tobacco.  Drug History:  reports that he does not use drugs.    Health Maintenance Topics with due status: Not Due       Topic Last Completion Date    Foot Exam 12/04/2019    Lipid Panel 12/04/2019    Hemoglobin A1c 12/04/2019    High Dose Statin 01/24/2020    Eye Exam 01/24/2020    Pneumococcal Vaccine (65+ Low/Medium Risk) 01/27/2020    Aspirin/Antiplatelet Therapy 02/13/2020     Immunization History   Administered Date(s) Administered    Influenza - Trivalent - PF (ADULT) 10/10/2018    Pneumococcal Conjugate - 13 Valent 01/27/2020       Review of patient's allergies indicates:  No Known Allergies    Current  Outpatient Medications:     aspirin (ECOTRIN) 81 MG EC tablet, 1 tablet, Disp: , Rfl:     blood sugar diagnostic (TRUE METRIX GLUCOSE TEST STRIP) Strp, as directed, Disp: , Rfl:     clopidogreL (PLAVIX) 75 mg tablet, Take 1 tablet (75 mg total) by mouth once daily., Disp: 90 tablet, Rfl: 1    gabapentin (NEURONTIN) 100 MG capsule, 1-2 capsules at bedtime for neuropathy pain, Disp: 60 capsule, Rfl: 5    glimepiride (AMARYL) 2 MG tablet, Take 1 tablet (2 mg total) by mouth once daily., Disp: 90 tablet, Rfl: 1    lancets (RELIAMED LANCET) 30 gauge Misc, as directed, Disp: , Rfl:     metoprolol tartrate (LOPRESSOR) 50 MG tablet, Take 1 tablet (50 mg total) by mouth 2 (two) times daily., Disp: 180 tablet, Rfl: 1    ranitidine (ZANTAC) 150 MG tablet, Take 1 tablet (150 mg total) by mouth nightly., Disp: 90 tablet, Rfl: 1    umeclidinium-vilanterol (ANORO ELLIPTA) 62.5-25 mcg/actuation DsDv, Inhale 1 puff into the lungs once daily. Controller, Disp: 1 each, Rfl: 3    atorvastatin (LIPITOR) 40 MG tablet, Take 1 tablet (40 mg total) by mouth once daily. (Patient not taking: Reported on 2/13/2020), Disp: 90 tablet, Rfl: 1    lisinopril (PRINIVIL,ZESTRIL) 5 MG tablet, Take 1 tablet by mouth once daily., Disp: , Rfl:     metFORMIN (GLUCOPHAGE-XR) 500 MG 24 hr tablet, Take 2 tablets by mouth 2 (two) times daily., Disp: , Rfl:     omeprazole (PRILOSEC) 20 MG capsule, Take 1 capsule by mouth once daily., Disp: , Rfl: 5    traMADol (ULTRAM) 50 mg tablet, Take 1 tablet (50 mg total) by mouth every 6 (six) hours as needed for Pain., Disp: 12 tablet, Rfl: 0    Review of Systems   Constitutional: Negative for chills and fever.   Respiratory: Negative for cough, shortness of breath and wheezing.    Cardiovascular: Negative for chest pain, palpitations and leg swelling.   Gastrointestinal: Negative for abdominal pain, constipation and diarrhea.   Genitourinary: Negative for dysuria and hematuria.   Skin: Negative for rash.  "  Neurological: Positive for numbness (Bilateral feet). Negative for dizziness and headaches.          Objective:      Vitals:    02/13/20 1157   BP: 126/62   Pulse: 60   Weight: 88.9 kg (196 lb)   Height: 5' 10" (1.778 m)     Physical Exam   Constitutional: He is oriented to person, place, and time. He appears well-developed and well-nourished.   HENT:   Head: Normocephalic and atraumatic.   Right Ear: Tympanic membrane and ear canal normal.   Left Ear: Tympanic membrane and ear canal normal.   Mouth/Throat: Mucous membranes are normal. No posterior oropharyngeal erythema.   Neck: Neck supple. Carotid bruit is not present.   Cardiovascular: Normal rate and regular rhythm. Exam reveals no gallop and no friction rub.   No murmur heard.  Pulmonary/Chest: Effort normal and breath sounds normal. No respiratory distress. He has no wheezes. He has no rales.   Abdominal: Soft. He exhibits no distension. There is no tenderness.   Lymphadenopathy:     He has no cervical adenopathy.   Neurological: He is alert and oriented to person, place, and time. He has normal strength. Gait normal.   Skin: Skin is warm and dry. No rash noted.   Psychiatric: He has a normal mood and affect.   Nursing note and vitals reviewed.        Assessment:       1. Diabetic polyneuropathy associated with type 2 diabetes mellitus    2. Dyslipidemia    3. Smokers' cough    4. History of CVA (cerebrovascular accident)    5. Prostate cancer screening           Plan:       Diabetic polyneuropathy associated with type 2 diabetes mellitus  -     Hemoglobin A1c; Future; Expected date: 02/13/2020  -long discussion with patient regarding importance of monitoring blood sugars and he cannot just randomly take insulin "when he feels sugar is up".  He has a very poor understanding of his medications and his medical conditions.  Discussed with patient risk with hypoglycemia especially with sulfonylurea and insulin and advised I recommend he resume metformin and " stop insulin completely.  A1c in December was 6.3% and will repeat on lab work today.  Continue glimepiride for now though if A1c is lower may be able to get rid of that also due to fear of hypoglycemia    Dyslipidemia  -diabetic dyslipidemia uncontrolled and apparently patient has recently stopped taking his statin due to concerns regarding medication.  Counseled patient on risk of uncontrolled cholesterol especially in light of diabetes including CVA, heart attack, renal failure and death and encouraged him to resume statin    Smokers' cough  -     umeclidinium-vilanterol (ANORO ELLIPTA) 62.5-25 mcg/actuation DsDv; Inhale 1 puff into the lungs once daily. Controller  Dispense: 1 each; Refill: 3  -stable    History of CVA (cerebrovascular accident)  -     clopidogreL (PLAVIX) 75 mg tablet; Take 1 tablet (75 mg total) by mouth once daily.  Dispense: 90 tablet; Refill: 1    Prostate cancer screening  -     PSA, Screening; Future; Expected date: 02/13/2020      Follow up in about 3 months (around 5/13/2020) for Diabetes; labwork today.        2/13/2020 Trista Alcala NP

## 2020-02-14 LAB
HBA1C MFR BLD: 6.7 % OF TOTAL HGB
PSA SERPL-MCNC: 3 NG/ML

## 2020-02-14 NOTE — PROGRESS NOTES
Please call patient and let him know diabetes test, A1c, is stable at 6.7%, up just slightly from 6.3% 2 months ago.  I recommend he continue with metformin and glimepiride however does not need insulin and if he has any episodes of weakness, shaking feeling or nausea then may need to reduce glimepiride dose to half a tablet.  Prostate levels within normal range

## 2020-02-17 ENCOUNTER — TELEPHONE (OUTPATIENT)
Dept: FAMILY MEDICINE | Facility: CLINIC | Age: 70
End: 2020-02-17

## 2020-02-17 NOTE — TELEPHONE ENCOUNTER
----- Message from Trista Alcala NP sent at 2/14/2020  5:34 PM CST -----  Please call patient and let him know diabetes test, A1c, is stable at 6.7%, up just slightly from 6.3% 2 months ago.  I recommend he continue with metformin and glimepiride however does not need insulin and if he has any episodes of weakness, shaking feeling or nausea then may need to reduce glimepiride dose to half a tablet.  Prostate levels within normal range

## 2020-02-17 NOTE — PROGRESS NOTES
Spoke to patient with results verbatim per Trista. Verbalized understanding on all. States that the Metformin causes diarrhea. Said he stopped it for a couple days and the diarrhea went away then he started back on it yesterday and the diarrhea came back.

## 2020-02-17 NOTE — TELEPHONE ENCOUNTER
Let him know to continue with glimeperide then however I still don't think he needs insulin unless he's checking his blood sugar and it's over 200

## 2020-02-18 NOTE — TELEPHONE ENCOUNTER
Spoke to patient with information verbatim per Trista, that it is ok to d/c Metformin due to diarrhea as well as that Trista doesn't think he needs the insulin unless his blood sugar is over 200. Verbalized understanding on all. Removed Metformin from med list.

## 2020-02-18 NOTE — PROGRESS NOTES
Spoke to patient with information verbatim per Trista, that it is ok to d/c Metformin due to diarrhea as well as that Trista doesn't think he needs the insulin unless his blood sugar is over 200. Verbalized understanding on all. Removed Metformin from med list.    Trista Alcala NP 17 hours ago (5:12 PM)  Yes, okay to stop metformin d/t diarrhea    You routed conversation to Trista Alcala NP 18 hours ago (4:30 PM  Just to be clear, it's ok for him to stop the Metformin?    Trista Alcala NP 18 hours ago (4:19 PM)  Let him know to continue with glimeperide then however I still don't think he needs insulin unless he's checking his blood sugar and it's over 200

## 2020-04-02 ENCOUNTER — TELEPHONE (OUTPATIENT)
Dept: RHEUMATOLOGY | Facility: CLINIC | Age: 70
End: 2020-04-02

## 2020-04-02 ENCOUNTER — OFFICE VISIT (OUTPATIENT)
Dept: RHEUMATOLOGY | Facility: CLINIC | Age: 70
End: 2020-04-02
Payer: MEDICARE

## 2020-04-02 DIAGNOSIS — R51.9 HEADACHE ABOVE THE EYE REGION: Primary | ICD-10-CM

## 2020-04-02 PROCEDURE — 1159F PR MEDICATION LIST DOCUMENTED IN MEDICAL RECORD: ICD-10-PCS | Mod: S$GLB,,, | Performed by: INTERNAL MEDICINE

## 2020-04-02 PROCEDURE — 1101F PT FALLS ASSESS-DOCD LE1/YR: CPT | Mod: CPTII,S$GLB,, | Performed by: INTERNAL MEDICINE

## 2020-04-02 PROCEDURE — 1101F PR PT FALLS ASSESS DOC 0-1 FALLS W/OUT INJ PAST YR: ICD-10-PCS | Mod: CPTII,S$GLB,, | Performed by: INTERNAL MEDICINE

## 2020-04-02 PROCEDURE — 99204 OFFICE O/P NEW MOD 45 MIN: CPT | Mod: S$GLB,,, | Performed by: INTERNAL MEDICINE

## 2020-04-02 PROCEDURE — 99204 PR OFFICE/OUTPT VISIT, NEW, LEVL IV, 45-59 MIN: ICD-10-PCS | Mod: S$GLB,,, | Performed by: INTERNAL MEDICINE

## 2020-04-02 PROCEDURE — 99999 PR PBB SHADOW E&M-EST. PATIENT-LVL II: CPT | Mod: PBBFAC,,, | Performed by: INTERNAL MEDICINE

## 2020-04-02 PROCEDURE — 1159F MED LIST DOCD IN RCRD: CPT | Mod: S$GLB,,, | Performed by: INTERNAL MEDICINE

## 2020-04-02 PROCEDURE — 99999 PR PBB SHADOW E&M-EST. PATIENT-LVL II: ICD-10-PCS | Mod: PBBFAC,,, | Performed by: INTERNAL MEDICINE

## 2020-04-02 NOTE — PROGRESS NOTES
Subjective:       Patient ID: Vijay Nguyen is a 70 y.o. male.    Chief Complaint: No chief complaint on file.    HPI     70 year old M with PMH of  Type II DM, CVA, CAD x6 stents on plavix, smoking, HTN here for evaluation.   He gets headaches about once a week. He reports that he has had headaches off and on for a couple of years now.  He reports that headaches started when he had tooth infection. He reports that when they removed the teeth, headaches are better.  He was seen by  optho and there was no evidence of GCA.    Denies any jaw claudication.He feels like his eyes feel dry but he is not taking eye drops.  He reports he has dry eyes since glaucoma surgery. He has pain in neck, hands, feet, and hips.  Hands on occasion will get swollen.  He takes gabapentin 100mg with improvement when he gets the headaches. He reports that when he gets the headaches, its all over the head.  Denies changes in weight. He has not taken steroids. When he went in to ED, he still had tooth infection.  He reports he was told both sides of teeth were infection.  Denies oral ulcers, fevers, raynauds, photosensitivity, pleurisy, or sob.    Past Medical History:   Diagnosis Date    Diabetic polyneuropathy associated with type 2 diabetes mellitus 11/12/2019    Dyslipidemia 11/12/2019    Gastroesophageal reflux disease without esophagitis 11/12/2019    History of CVA (cerebrovascular accident) 11/12/2019    Long-term insulin use 11/12/2019    Smokers' cough 11/12/2019       Review of Systems   Constitutional: Negative for activity change, appetite change, chills, diaphoresis, fatigue and fever.   HENT: Negative for ear discharge, ear pain, hearing loss, mouth sores, nosebleeds and sinus pressure.    Eyes: Negative.  Negative for photophobia, pain, discharge, redness and itching.   Respiratory: Negative for cough, chest tightness and shortness of breath.    Cardiovascular: Negative for chest pain, palpitations and leg swelling.    Gastrointestinal: Negative for abdominal distention, blood in stool and nausea.   Endocrine: Negative for cold intolerance, heat intolerance, polydipsia and polyphagia.   Genitourinary: Negative for flank pain, genital sores and hematuria.   Musculoskeletal: Negative for arthralgias, back pain, gait problem, joint swelling, myalgias, neck pain and neck stiffness.   Skin: Negative for color change, pallor and rash.   Neurological: Positive for headaches. Negative for dizziness, weakness and light-headedness.   Hematological: Negative for adenopathy. Does not bruise/bleed easily.   Psychiatric/Behavioral: Negative for decreased concentration and hallucinations. The patient is not nervous/anxious.              Objective:   There were no vitals taken for this visit.     Physical Exam   Constitutional: He is oriented to person, place, and time and well-developed, well-nourished, and in no distress. No distress.   HENT:   Head: Normocephalic and atraumatic.   Right Ear: External ear normal.   Left Ear: External ear normal.   Eyes: Conjunctivae and EOM are normal.   Neck: Normal range of motion. Neck supple. No JVD present. No tracheal deviation present. No thyromegaly present.   Cardiovascular: Normal rate, regular rhythm and normal heart sounds.    Pulmonary/Chest: Effort normal and breath sounds normal. No stridor. No respiratory distress. He has no wheezes. He has no rales. He exhibits no tenderness.   Abdominal: Soft. Bowel sounds are normal. He exhibits no distension and no mass. There is no tenderness. There is no rebound and no guarding.   Lymphadenopathy:     He has no cervical adenopathy.   Neurological: He is alert and oriented to person, place, and time.   Skin: Skin is warm and dry. He is not diaphoretic.     Musculoskeletal: Normal range of motion. He exhibits no edema, tenderness or deformity.          Labs: reviewed    No data to display     CT head (2020): I personally reviewed; Mucous retention cyst or  polyp in the left maxillary sinus anteriorly.  Assessment:     70 year old M with PMH of  Type II DM, CVA, CAD x6 stents on plavix, smoking, HTN here for evaluation.   He was having headaches in setting of teeth infection. Once infected teeth were removed, his headaches have almost completely resolved.  He denies any symptoms of PMR. I discussed with patient that given lack of tenderness on exam with palpation (daughter palpated temporal area), and improvement with removal of infected teeth, I have low suspicion for GCA,    No diagnosis found.        Plan:       Problem List Items Addressed This Visit     None      *  discussed with patient signs and symptoms of GCA/PMR  rtc in 3 months or prn      The patient location is: home  The chief complaint leading to consultation is: headache  Visit type: Virtual visit with synchronous audio and video  Total time spent with patient:20 minutes  Each patient to whom he or she provides medical services by telemedicine is:  (1) informed of the relationship between the physician and patient and the respective role of any other health care provider with respect to management of the patient; and (2) notified that he or she may decline to receive medical services by telemedicine and may withdraw from such care at any time.  Educated patient on Covid virus and prevention strategies.

## 2020-04-02 NOTE — LETTER
April 6, 2020      Dain Liu MD  [address]  [facility] [address]           [facility]  [address]  [facility] [address]  Phone: [phone]  Fax: [phone]          Patient: Vijay Nguyen   MR Number: [id number]   YOB: 1950   Date of Visit: 4/2/2020       Dear Dr. Dain Liu:    Thank you for referring Vijay Nguyen to me for evaluation. Attached you will find relevant portions of my assessment and plan of care.    If you have questions, please do not hesitate to call me. I look forward to following Vijay Nguyen along with you.    Sincerely,    Yenni Rodrigues MD    Enclosure  CC:  No Recipients    If you would like to receive this communication electronically, please contact [email or web address] or [phone] to request more information on [facility] Link access.    For providers and/or their staff who would like to refer a patient to [facility], please contact us through our one-stop-shop provider referral line, [facility], at [phone].    If you feel you have received this communication in error or would no longer like to receive these types of communications, please e-mail [email or web address]

## 2020-04-02 NOTE — TELEPHONE ENCOUNTER
----- Message from Carlyn Campbell sent at 4/2/2020  8:04 AM CDT -----  Contact: Franci, Daughter  Ms. Koroma is calling to find out if the pt is come in for his appt or if it will be done via telephone conference?    She can be reached at [phone].    Thank you.

## 2020-05-05 ENCOUNTER — TELEPHONE (OUTPATIENT)
Dept: FAMILY MEDICINE | Facility: CLINIC | Age: 70
End: 2020-05-05

## 2020-05-05 NOTE — TELEPHONE ENCOUNTER
----- Message from [facility], RT sent at 1/23/2020  3:54 PM CST -----  Regarding: LDCT done?  Patient due for LDCT from [facility] action.

## 2020-05-13 ENCOUNTER — TELEPHONE (OUTPATIENT)
Dept: FAMILY MEDICINE | Facility: CLINIC | Age: 70
End: 2020-05-13

## 2020-05-19 ENCOUNTER — OFFICE VISIT (OUTPATIENT)
Dept: FAMILY MEDICINE | Facility: CLINIC | Age: 70
End: 2020-05-19
Payer: MEDICARE

## 2020-05-19 VITALS
HEART RATE: 80 BPM | SYSTOLIC BLOOD PRESSURE: 164 MMHG | BODY MASS INDEX: 28.92 KG/M2 | HEIGHT: 70 IN | WEIGHT: 202 LBS | OXYGEN SATURATION: 97 % | DIASTOLIC BLOOD PRESSURE: 80 MMHG | TEMPERATURE: 99 F

## 2020-05-19 DIAGNOSIS — Z91.199 NONCOMPLIANCE: ICD-10-CM

## 2020-05-19 DIAGNOSIS — I10 ESSENTIAL HYPERTENSION: ICD-10-CM

## 2020-05-19 DIAGNOSIS — E78.5 DYSLIPIDEMIA: ICD-10-CM

## 2020-05-19 DIAGNOSIS — K21.9 GASTROESOPHAGEAL REFLUX DISEASE, ESOPHAGITIS PRESENCE NOT SPECIFIED: ICD-10-CM

## 2020-05-19 DIAGNOSIS — Z79.899 ENCOUNTER FOR LONG-TERM (CURRENT) USE OF OTHER MEDICATIONS: ICD-10-CM

## 2020-05-19 DIAGNOSIS — Z86.73 HISTORY OF CVA (CEREBROVASCULAR ACCIDENT): ICD-10-CM

## 2020-05-19 DIAGNOSIS — Z12.5 PROSTATE CANCER SCREENING: ICD-10-CM

## 2020-05-19 DIAGNOSIS — F17.210 CIGARETTE SMOKER: ICD-10-CM

## 2020-05-19 DIAGNOSIS — E11.42 DIABETIC POLYNEUROPATHY ASSOCIATED WITH TYPE 2 DIABETES MELLITUS: Primary | ICD-10-CM

## 2020-05-19 PROCEDURE — 3044F HG A1C LEVEL LT 7.0%: CPT | Mod: S$GLB,,, | Performed by: NURSE PRACTITIONER

## 2020-05-19 PROCEDURE — 3077F SYST BP >= 140 MM HG: CPT | Mod: S$GLB,,, | Performed by: NURSE PRACTITIONER

## 2020-05-19 PROCEDURE — 1101F PR PT FALLS ASSESS DOC 0-1 FALLS W/OUT INJ PAST YR: ICD-10-PCS | Mod: S$GLB,,, | Performed by: NURSE PRACTITIONER

## 2020-05-19 PROCEDURE — 1159F MED LIST DOCD IN RCRD: CPT | Mod: S$GLB,,, | Performed by: NURSE PRACTITIONER

## 2020-05-19 PROCEDURE — 99214 PR OFFICE/OUTPT VISIT, EST, LEVL IV, 30-39 MIN: ICD-10-PCS | Mod: S$GLB,,, | Performed by: NURSE PRACTITIONER

## 2020-05-19 PROCEDURE — 99214 OFFICE O/P EST MOD 30 MIN: CPT | Mod: S$GLB,,, | Performed by: NURSE PRACTITIONER

## 2020-05-19 PROCEDURE — 3079F PR MOST RECENT DIASTOLIC BLOOD PRESSURE 80-89 MM HG: ICD-10-PCS | Mod: S$GLB,,, | Performed by: NURSE PRACTITIONER

## 2020-05-19 PROCEDURE — 3077F PR MOST RECENT SYSTOLIC BLOOD PRESSURE >= 140 MM HG: ICD-10-PCS | Mod: S$GLB,,, | Performed by: NURSE PRACTITIONER

## 2020-05-19 PROCEDURE — 1101F PT FALLS ASSESS-DOCD LE1/YR: CPT | Mod: S$GLB,,, | Performed by: NURSE PRACTITIONER

## 2020-05-19 PROCEDURE — 3079F DIAST BP 80-89 MM HG: CPT | Mod: S$GLB,,, | Performed by: NURSE PRACTITIONER

## 2020-05-19 PROCEDURE — 1159F PR MEDICATION LIST DOCUMENTED IN MEDICAL RECORD: ICD-10-PCS | Mod: S$GLB,,, | Performed by: NURSE PRACTITIONER

## 2020-05-19 PROCEDURE — 3044F PR MOST RECENT HEMOGLOBIN A1C LEVEL <7.0%: ICD-10-PCS | Mod: S$GLB,,, | Performed by: NURSE PRACTITIONER

## 2020-05-19 RX ORDER — PANTOPRAZOLE SODIUM 40 MG/1
40 TABLET, DELAYED RELEASE ORAL DAILY
Qty: 90 TABLET | Refills: 1 | Status: SHIPPED | OUTPATIENT
Start: 2020-05-19 | End: 2020-08-19 | Stop reason: SDUPTHER

## 2020-05-19 RX ORDER — METOPROLOL TARTRATE 50 MG/1
50 TABLET ORAL 2 TIMES DAILY
Qty: 180 TABLET | Refills: 1 | Status: SHIPPED | OUTPATIENT
Start: 2020-05-19 | End: 2020-08-19 | Stop reason: SDUPTHER

## 2020-05-19 RX ORDER — METFORMIN HYDROCHLORIDE 500 MG/1
500 TABLET, EXTENDED RELEASE ORAL
Qty: 90 TABLET | Refills: 1 | Status: SHIPPED | OUTPATIENT
Start: 2020-05-19 | End: 2020-08-19 | Stop reason: SDUPTHER

## 2020-05-19 RX ORDER — CLOPIDOGREL BISULFATE 75 MG/1
75 TABLET ORAL DAILY
Qty: 90 TABLET | Refills: 1 | Status: SHIPPED | OUTPATIENT
Start: 2020-05-19 | End: 2020-08-19 | Stop reason: SDUPTHER

## 2020-05-19 RX ORDER — GABAPENTIN 100 MG/1
CAPSULE ORAL
Qty: 180 CAPSULE | Refills: 1 | Status: SHIPPED | OUTPATIENT
Start: 2020-05-19 | End: 2020-08-19 | Stop reason: SDUPTHER

## 2020-05-19 RX ORDER — LISINOPRIL 10 MG/1
10 TABLET ORAL DAILY
Qty: 90 TABLET | Refills: 1 | Status: SHIPPED | OUTPATIENT
Start: 2020-05-19 | End: 2020-08-19 | Stop reason: SDUPTHER

## 2020-05-19 RX ORDER — ATORVASTATIN CALCIUM 40 MG/1
40 TABLET, FILM COATED ORAL DAILY
Qty: 90 TABLET | Refills: 1 | Status: SHIPPED | OUTPATIENT
Start: 2020-05-19 | End: 2020-08-19 | Stop reason: SDUPTHER

## 2020-05-19 RX ORDER — ASPIRIN 81 MG/1
81 TABLET ORAL DAILY
Status: ON HOLD | COMMUNITY
Start: 2020-05-19 | End: 2023-01-24 | Stop reason: SDUPTHER

## 2020-05-19 NOTE — PROGRESS NOTES
SUBJECTIVE:    Patient ID: Vijay Nguyen is a 70 y.o. male.    Chief Complaint: Follow-up (Brought bottles)    Pt here for DM f/u- had a couple no shows since last visit. Brings in pillbox and a couple med bottles and appears hes only taking ranitidine and metoprolol- not on any other meds. States quit metformin d/t diarrhea. No longer taking insulin d/t hypoglycemia. Pt extremely poor historian and has very poor insight to his medications and diagnoses.  Still smoking 1/4-1/2ppd- has smoked since age 6. LDCT was ordered in January however pt admits he never went and had test done. Reports some intermittent wheezing and SOB with exertion.      Office Visit on 02/13/2020   Component Date Value Ref Range Status    Hemoglobin A1C 02/13/2020 6.7* <5.7 % of total Hgb Final    PROSTATE SPECIFIC ANTIGEN, SCR - Q* 02/13/2020 3.0  < OR = 4.0 ng/mL Final   Admission on 01/23/2020, Discharged on 01/24/2020   Component Date Value Ref Range Status    POCT Glucose 01/23/2020 68* 70 - 110 mg/dL Final    WBC 01/23/2020 9.80  3.90 - 12.70 K/uL Final    RBC 01/23/2020 4.50* 4.60 - 6.20 M/uL Final    Hemoglobin 01/23/2020 13.2* 14.0 - 18.0 g/dL Final    Hematocrit 01/23/2020 39.9* 40.0 - 54.0 % Final    Mean Corpuscular Volume 01/23/2020 89  82 - 98 fL Final    Mean Corpuscular Hemoglobin 01/23/2020 29.4  27.0 - 31.0 pg Final    Mean Corpuscular Hemoglobin Conc 01/23/2020 33.2  32.0 - 36.0 g/dL Final    RDW 01/23/2020 14.4  11.5 - 14.5 % Final    Platelets 01/23/2020 216  150 - 350 K/uL Final    MPV 01/23/2020 9.6  9.2 - 12.9 fL Final    Gran # (ANC) 01/23/2020 7.0  1.8 - 7.7 K/uL Final    Lymph # 01/23/2020 1.9  1.0 - 4.8 K/uL Final    Mono # 01/23/2020 0.6  0.3 - 1.0 K/uL Final    Eos # 01/23/2020 0.2  0.0 - 0.5 K/uL Final    Baso # 01/23/2020 0.00  0.00 - 0.20 K/uL Final    Gran% 01/23/2020 71.3  38.0 - 73.0 % Final    Lymph% 01/23/2020 19.9  18.0 - 48.0 % Final    Mono% 01/23/2020 6.4  4.0 - 15.0 % Final     Eosinophil% 01/23/2020 1.9  0.0 - 8.0 % Final    Basophil% 01/23/2020 0.5  0.0 - 1.9 % Final    Differential Method 01/23/2020 Automated   Final    Sodium 01/23/2020 136  136 - 145 mmol/L Final    Potassium 01/23/2020 4.0  3.5 - 5.1 mmol/L Final    Chloride 01/23/2020 101  101 - 111 mmol/L Final    CO2 01/23/2020 25  23 - 29 mmol/L Final    Glucose 01/23/2020 92  74 - 118 mg/dL Final    BUN, Bld 01/23/2020 13  8 - 23 mg/dL Final    Creatinine 01/23/2020 1.1  0.5 - 1.4 mg/dL Final    Calcium 01/23/2020 9.5  8.6 - 10.0 mg/dL Final    Total Protein 01/23/2020 7.6  6.0 - 8.4 g/dL Final    Albumin 01/23/2020 4.1  3.5 - 5.2 g/dL Final    Total Bilirubin 01/23/2020 0.3  0.3 - 1.2 mg/dL Final    Alkaline Phosphatase 01/23/2020 56  38 - 126 U/L Final    AST 01/23/2020 22  15 - 41 U/L Final    ALT 01/23/2020 24  17 - 63 U/L Final    Anion Gap 01/23/2020 10  8 - 16 mmol/L Final    eGFR if African American 01/23/2020 >60.0  >60 mL/min/1.73 m^2 Final    eGFR if non African American 01/23/2020 >60.0  >60 mL/min/1.73 m^2 Final    Prothrombin Time 01/23/2020 9.8  9.0 - 12.5 sec Final    INR 01/23/2020 0.9  0.8 - 1.2 Final    TSH 01/23/2020 1.63  0.45 - 5.33 uIU/mL Final    Sed Rate 01/23/2020 56* 0 - 10 mm/Hr Final    CRP 01/23/2020 <0.50  0.00 - 1.00 mg/dL Final   Office Visit on 12/04/2019   Component Date Value Ref Range Status    Hemoglobin A1C 12/04/2019 6.3* <5.7 % of total Hgb Final    Glucose 12/04/2019 111* 65 - 99 mg/dL Final    BUN, Bld 12/04/2019 11  7 - 25 mg/dL Final    Creatinine 12/04/2019 1.00  0.70 - 1.25 mg/dL Final    eGFR if non African American 12/04/2019 76  > OR = 60 mL/min/1.73m2 Final    eGFR if  12/04/2019 89  > OR = 60 mL/min/1.73m2 Final    BUN/Creatinine Ratio 12/04/2019 NOT APPLICABLE  6 - 22 (calc) Final    Sodium 12/04/2019 136  135 - 146 mmol/L Final    Potassium 12/04/2019 4.5  3.5 - 5.3 mmol/L Final    Chloride 12/04/2019 102  98 - 110 mmol/L  Final    CO2 12/04/2019 28  20 - 32 mmol/L Final    Calcium 12/04/2019 9.4  8.6 - 10.3 mg/dL Final    Total Protein 12/04/2019 7.2  6.1 - 8.1 g/dL Final    Albumin 12/04/2019 4.2  3.6 - 5.1 g/dL Final    Globulin, Total 12/04/2019 3.0  1.9 - 3.7 g/dL (calc) Final    Albumin/Globulin Ratio 12/04/2019 1.4  1.0 - 2.5 (calc) Final    Total Bilirubin 12/04/2019 0.4  0.2 - 1.2 mg/dL Final    Alkaline Phosphatase 12/04/2019 64  40 - 115 U/L Final    AST 12/04/2019 20  10 - 35 U/L Final    ALT 12/04/2019 21  9 - 46 U/L Final    Cholesterol 12/04/2019 157  <200 mg/dL Final    HDL 12/04/2019 34* >40 mg/dL Final    Triglycerides 12/04/2019 137  <150 mg/dL Final    LDL Cholesterol 12/04/2019 99  mg/dL (calc) Final    Hdl/Cholesterol Ratio 12/04/2019 4.6  <5.0 (calc) Final    Non HDL Chol. (LDL+VLDL) 12/04/2019 123  <130 mg/dL (calc) Final       Past Medical History:   Diagnosis Date    Diabetic polyneuropathy associated with type 2 diabetes mellitus 11/12/2019    Dyslipidemia 11/12/2019    Gastroesophageal reflux disease without esophagitis 11/12/2019    History of CVA (cerebrovascular accident) 11/12/2019    Long-term insulin use 11/12/2019    Smokers' cough 11/12/2019     History reviewed. No pertinent surgical history.  History reviewed. No pertinent family history.    Marital Status: Single  Alcohol History:  reports that he drinks alcohol.  Tobacco History:  reports that he has been smoking. He has been smoking about 0.25 packs per day. He has never used smokeless tobacco.  Drug History:  reports that he does not use drugs.    Health Maintenance Topics with due status: Not Due       Topic Last Completion Date    Influenza Vaccine 10/10/2018    Lipid Panel 12/04/2019    Pneumococcal Vaccine (65+ Low/Medium Risk) 01/27/2020    High Dose Statin 05/19/2020    Aspirin/Antiplatelet Therapy 05/19/2020     Immunization History   Administered Date(s) Administered    Influenza - Trivalent - PF (ADULT)  10/10/2018    Pneumococcal Conjugate - 13 Valent 01/27/2020       Review of patient's allergies indicates:  No Known Allergies    Current Outpatient Medications:     blood sugar diagnostic (TRUE METRIX GLUCOSE TEST STRIP) Strp, as directed, Disp: , Rfl:     metoprolol tartrate (LOPRESSOR) 50 MG tablet, Take 1 tablet (50 mg total) by mouth 2 (two) times daily., Disp: 180 tablet, Rfl: 1    aspirin (ECOTRIN) 81 MG EC tablet, Take 1 tablet (81 mg total) by mouth once daily., Disp: , Rfl:     atorvastatin (LIPITOR) 40 MG tablet, Take 1 tablet (40 mg total) by mouth once daily., Disp: 90 tablet, Rfl: 1    clopidogreL (PLAVIX) 75 mg tablet, Take 1 tablet (75 mg total) by mouth once daily., Disp: 90 tablet, Rfl: 1    gabapentin (NEURONTIN) 100 MG capsule, 1-2 capsules at bedtime for neuropathy pain, Disp: 180 capsule, Rfl: 1    lancets (RELIAMED LANCET) 30 gauge Misc, as directed, Disp: , Rfl:     lisinopriL 10 MG tablet, Take 1 tablet (10 mg total) by mouth once daily., Disp: 90 tablet, Rfl: 1    metFORMIN (GLUCOPHAGE-XR) 500 MG XR 24hr tablet, Take 1 tablet (500 mg total) by mouth daily with breakfast., Disp: 90 tablet, Rfl: 1    pantoprazole (PROTONIX) 40 MG tablet, Take 1 tablet (40 mg total) by mouth once daily., Disp: 90 tablet, Rfl: 1    umeclidinium-vilanterol (ANORO ELLIPTA) 62.5-25 mcg/actuation DsDv, Inhale 1 puff into the lungs once daily. Controller (Patient not taking: Reported on 5/19/2020), Disp: 1 each, Rfl: 3    Review of Systems   Constitutional: Negative for chills and fever.   Respiratory: Positive for shortness of breath (with exertion, relieved with rest) and wheezing (occasionally). Negative for cough.    Cardiovascular: Negative for chest pain, palpitations and leg swelling.   Gastrointestinal: Negative for abdominal pain, constipation and diarrhea.   Genitourinary: Negative for dysuria and hematuria.   Musculoskeletal: Positive for back pain (across lower back) and neck stiffness.  "  Skin: Negative for rash.   Neurological: Positive for numbness (Bilateral feet). Negative for dizziness and headaches.          Objective:      Vitals:    05/19/20 1352 05/19/20 1355   BP: (!) 160/82 (!) 164/80   Pulse: 80    Temp: 98.8 °F (37.1 °C)    SpO2: 97%    Weight: 91.6 kg (202 lb)    Height: 5' 10" (1.778 m)      Physical Exam   Constitutional: He is oriented to person, place, and time. He appears well-developed and well-nourished.   HENT:   Head: Normocephalic and atraumatic.   Right Ear: Tympanic membrane and ear canal normal.   Left Ear: Tympanic membrane and ear canal normal.   Mouth/Throat: Mucous membranes are normal. No posterior oropharyngeal erythema.   Neck: Neck supple. Carotid bruit is not present.   Cardiovascular: Normal rate and regular rhythm. Exam reveals no gallop and no friction rub.   No murmur heard.  Pulmonary/Chest: Effort normal and breath sounds normal. No respiratory distress. He has no wheezes. He has no rales.   Abdominal: Soft. He exhibits no distension. There is no tenderness.   Musculoskeletal: He exhibits no edema.   Lymphadenopathy:     He has no cervical adenopathy.   Neurological: He is alert and oriented to person, place, and time. He has normal strength. Gait normal.   Skin: Skin is warm and dry. No rash noted.   Psychiatric: He has a normal mood and affect.   Nursing note and vitals reviewed.        Assessment:       1. Diabetic polyneuropathy associated with type 2 diabetes mellitus    2. Dyslipidemia    3. Essential hypertension    4. History of CVA (cerebrovascular accident)    5. Gastroesophageal reflux disease, esophagitis presence not specified    6. Encounter for long-term (current) use of other medications    7. Cigarette smoker    8. Prostate cancer screening    9. Noncompliance           Plan:       Diabetic polyneuropathy associated with type 2 diabetes mellitus  -     gabapentin (NEURONTIN) 100 MG capsule; 1-2 capsules at bedtime for neuropathy pain  " Dispense: 180 capsule; Refill: 1  -     metFORMIN (GLUCOPHAGE-XR) 500 MG XR 24hr tablet; Take 1 tablet (500 mg total) by mouth daily with breakfast.  Dispense: 90 tablet; Refill: 1  -     Hemoglobin A1C; Future; Expected date: 05/19/2020  -     Comprehensive metabolic panel; Future; Expected date: 05/19/2020  -     Lipid Panel; Future; Expected date: 05/19/2020  -glucose bgvcm=913, unable to do A1C d/t insurance coverage. Pt advised he likely needs some type of DM medication so recommended trying to take metformin 500mg once a day. Pt has very poor health literacy and lack of understanding with any of his diagnoses    Dyslipidemia  -     atorvastatin (LIPITOR) 40 MG tablet; Take 1 tablet (40 mg total) by mouth once daily.  Dispense: 90 tablet; Refill: 1  -     Lipid Panel; Future; Expected date: 05/19/2020  -uncontrolled on last labs and apparently hasn't been taking statin- stressed importance of resuming atorvastatin and recommend f/u labs before next visit    Essential hypertension  -     lisinopriL 10 MG tablet; Take 1 tablet (10 mg total) by mouth once daily.  Dispense: 90 tablet; Refill: 1  -     metoprolol tartrate (LOPRESSOR) 50 MG tablet; Take 1 tablet (50 mg total) by mouth 2 (two) times daily.  Dispense: 180 tablet; Refill: 1  -     Microalbumin/creatinine urine ratio; Future; Expected date: 05/19/2020  -     Urinalysis, Reflex to Urine Culture Urine, Clean Catch; Future; Expected date: 05/19/2020  -uncontrolled d/t medication noncompliance- stressed importance of BP control and risks of CVA, MI, RF and death- resume lisinopril 10mg daily and recheck in 2 weeks    History of CVA (cerebrovascular accident)  -     clopidogreL (PLAVIX) 75 mg tablet; Take 1 tablet (75 mg total) by mouth once daily.  Dispense: 90 tablet; Refill: 1  -     aspirin (ECOTRIN) 81 MG EC tablet; Take 1 tablet (81 mg total) by mouth once daily.  -pt advised he should be on plavix daily    Encounter for long-term (current) use of other  "medications    Cigarette smoker  Assistance with smoking cessation was offered, including:  []  Medications  [x]  Counseling  []  Printed Information on Smoking Cessation  [x]  Referral to a Smoking Cessation Program    Patient was counseled regarding smoking for 3-10 minutes. Encouraged importance of smoking cessation- declines referral or medication to help quit    Prostate cancer screening  -     PSA, Screening; Future; Expected date: 05/19/2020    Gastroesophageal reflux disease, esophagitis presence not specified  -     pantoprazole (PROTONIX) 40 MG tablet; Take 1 tablet (40 mg total) by mouth once daily.  Dispense: 90 tablet; Refill: 1  -advised ranitidine has been recalled- will switch to pantoprazole    Noncompliance  - stressed to pt he needs to attend appointments and take his medication as prescribed- advised if he "runs out" he needs to call us or the pharmacy    Follow up in about 3 months (around 8/19/2020) for Diabetes, HTN, nurse visit in 2 weeks for BP check, Labs before next visit.        5/19/2020 Trista Alcala NP    "

## 2020-06-01 ENCOUNTER — TELEPHONE (OUTPATIENT)
Dept: FAMILY MEDICINE | Facility: CLINIC | Age: 70
End: 2020-06-01

## 2020-06-01 NOTE — TELEPHONE ENCOUNTER
----- Message from Shellie Gaviria sent at 12/6/2019 10:14 AM CST -----  Notes recorded by Trista Alcala NP on 12/5/2019 at 9:11 PM CST  Please call pt and let him know his blood sugar/diabetes test is stable. Kidney and liver function within normal range. His bad cholesterol (LDL) is high at 99, should be less than 70 and given Diabetes and increased risk for heart disease recommend we switch him to atorvastatin 40mg daily. Repeat CMP, lipids, TSH, CBC, PSA, micro in 6 months.

## 2020-06-04 ENCOUNTER — TELEPHONE (OUTPATIENT)
Dept: FAMILY MEDICINE | Facility: CLINIC | Age: 70
End: 2020-06-04

## 2020-06-04 NOTE — TELEPHONE ENCOUNTER
----- Message from [facility], RT sent at 1/23/2020  3:54 PM CST -----  Regarding: LDCT done?  Patient due for LDCT from [facility] action.

## 2020-06-04 NOTE — TELEPHONE ENCOUNTER
From [facility]-LDCT due 3rd attempt. LMOR that lung scan is due. This is 2nd attempt to call pt and you discussed need for this at ov. Can I robbie reminder complete?

## 2020-06-19 ENCOUNTER — TELEPHONE (OUTPATIENT)
Dept: FAMILY MEDICINE | Facility: CLINIC | Age: 70
End: 2020-06-19

## 2020-06-19 NOTE — TELEPHONE ENCOUNTER
----- Message from Kalyan Larsen sent at 6/19/2020 11:57 AM CDT -----  Regarding: Returning nurse call  Contact: Vijay Nguyen  Pt says that he just received a call from our office and someone told him that he is past due on getting blood work done. I don't see anything in his chart. Please give the patient a call back # [phone]

## 2020-06-22 NOTE — TELEPHONE ENCOUNTER
LMOR to call Ibis JORDAN. I did not call him regarding lab on Friday, or at anytime, but I was trying to reach him earlier in June because he is due for LDCT.

## 2020-06-30 LAB
ALBUMIN SERPL-MCNC: 4.5 G/DL (ref 3.6–5.1)
ALBUMIN/CREAT UR: 5 MCG/MG CREAT
ALBUMIN/GLOB SERPL: 1.5 (CALC) (ref 1–2.5)
ALP SERPL-CCNC: 68 U/L (ref 35–144)
ALT SERPL-CCNC: 23 U/L (ref 9–46)
APPEARANCE UR: CLEAR
AST SERPL-CCNC: 19 U/L (ref 10–35)
BACTERIA #/AREA URNS HPF: ABNORMAL /HPF
BACTERIA UR CULT: ABNORMAL
BILIRUB SERPL-MCNC: 0.4 MG/DL (ref 0.2–1.2)
BILIRUB UR QL STRIP: NEGATIVE
BUN SERPL-MCNC: 18 MG/DL (ref 7–25)
BUN/CREAT SERPL: 13 (CALC) (ref 6–22)
CALCIUM SERPL-MCNC: 9.8 MG/DL (ref 8.6–10.3)
CHLORIDE SERPL-SCNC: 103 MMOL/L (ref 98–110)
CHOLEST SERPL-MCNC: 253 MG/DL
CHOLEST/HDLC SERPL: 9 (CALC)
CO2 SERPL-SCNC: 23 MMOL/L (ref 20–32)
COLOR UR: ABNORMAL
CREAT SERPL-MCNC: 1.43 MG/DL (ref 0.7–1.18)
CREAT UR-MCNC: 564 MG/DL (ref 20–320)
GFRSERPLBLD MDRD-ARVRAT: 49 ML/MIN/1.73M2
GLOBULIN SER CALC-MCNC: 3 G/DL (CALC) (ref 1.9–3.7)
GLUCOSE SERPL-MCNC: 134 MG/DL (ref 65–139)
GLUCOSE UR QL STRIP: NEGATIVE
HBA1C MFR BLD: 7.3 % OF TOTAL HGB
HDLC SERPL-MCNC: 28 MG/DL
HGB UR QL STRIP: NEGATIVE
HYALINE CASTS #/AREA URNS LPF: ABNORMAL /LPF
KETONES UR QL STRIP: ABNORMAL
LDLC SERPL CALC-MCNC: 182 MG/DL (CALC)
LEUKOCYTE ESTERASE UR QL STRIP: NEGATIVE
MICROALBUMIN UR-MCNC: 2.7 MG/DL
NITRITE UR QL STRIP: NEGATIVE
NONHDLC SERPL-MCNC: 225 MG/DL (CALC)
PH UR STRIP: ABNORMAL [PH] (ref 5–8)
POTASSIUM SERPL-SCNC: 4.4 MMOL/L (ref 3.5–5.3)
PROT SERPL-MCNC: 7.5 G/DL (ref 6.1–8.1)
PROT UR QL STRIP: ABNORMAL
PSA SERPL-MCNC: 4.1 NG/ML
RBC #/AREA URNS HPF: ABNORMAL /HPF
SODIUM SERPL-SCNC: 135 MMOL/L (ref 135–146)
SP GR UR STRIP: 1.03 (ref 1–1.03)
SQUAMOUS #/AREA URNS HPF: ABNORMAL /HPF
TRIGL SERPL-MCNC: 230 MG/DL
WBC #/AREA URNS HPF: ABNORMAL /HPF

## 2020-07-01 ENCOUNTER — TELEPHONE (OUTPATIENT)
Dept: FAMILY MEDICINE | Facility: CLINIC | Age: 70
End: 2020-07-01

## 2020-07-01 DIAGNOSIS — R97.20 ELEVATED PSA: Primary | ICD-10-CM

## 2020-07-08 NOTE — PROGRESS NOTES
Please call patient and let him know his cholesterol levels are very elevated.  He needs to resume taking the atorvastatin 40 mg daily as previously prescribed.  His blood sugar average is also up with A1c of 7.3%.  Ask if he is restarted the metformin 500 mg daily as I recommended at his appointment.  His kidney function is also slightly weakened compared to previous which may be due to elevated blood sugar or elevated blood pressure so he needs to make sure he is taking the lisinopril 10 mg daily.  His PSA is just slightly elevated at 4.1 and he has been referred to Dr. Hansen, urology.  Repeat CMP and lipids in 4 months

## 2020-07-09 ENCOUNTER — TELEPHONE (OUTPATIENT)
Dept: FAMILY MEDICINE | Facility: CLINIC | Age: 70
End: 2020-07-09

## 2020-07-09 NOTE — TELEPHONE ENCOUNTER
----- Message from Trista Alcala NP sent at 7/8/2020  8:20 PM CDT -----  Please call patient and let him know his cholesterol levels are very elevated.  He needs to resume taking the atorvastatin 40 mg daily as previously prescribed.  His blood sugar average is also up with A1c of 7.3%.  Ask if he is restarted the metformin 500 mg daily as I recommended at his appointment.  His kidney function is also slightly weakened compared to previous which may be due to elevated blood sugar or elevated blood pressure so he needs to make sure he is taking the lisinopril 10 mg daily.  His PSA is just slightly elevated at 4.1 and he has been referred to Dr. Hansen, urology.  Repeat CMP and lipids in 4 months

## 2020-07-09 NOTE — TELEPHONE ENCOUNTER
Spoke to pt. Advised of what Trista said below. Patient states he is taking his Metformin and that he will take his other meds. Referral faxed.

## 2020-07-10 ENCOUNTER — TELEPHONE (OUTPATIENT)
Dept: UROLOGY | Facility: CLINIC | Age: 70
End: 2020-07-10

## 2020-07-10 NOTE — TELEPHONE ENCOUNTER
Referral for [facility] - Paige Beckwith     Spoke w pt and pts daughter regarding pts urological history both declined pt ever seeing a urologist and psa lab history is noted with Dr Hameed's office. Was offered for future virtual visit declined due to only uses flip phone for cellular device. Pt and daughter was made aware will return call when appointed appt voiced ok and understanding.psa history requested from [facility]

## 2020-08-03 NOTE — TELEPHONE ENCOUNTER
RAINER attempted to call pt and daughter x3 on Friday to offer appointment with me 8/4 and no answer.     Can offer with any MD as per his availability, otherwise can offer with me 8/18 at 1030. If sooner appt avail, or not avail 8/18 at that time, schedule with other provider

## 2020-08-13 ENCOUNTER — TELEPHONE (OUTPATIENT)
Dept: FAMILY MEDICINE | Facility: CLINIC | Age: 70
End: 2020-08-13

## 2020-08-13 NOTE — TELEPHONE ENCOUNTER
----- Message from Justine Lazaro sent at 8/13/2020 12:13 PM CDT -----  Pt stopped by and said he needs a refill on all his medications- I told him he has an appt next week and he said he is completely out    [phone]

## 2020-08-13 NOTE — TELEPHONE ENCOUNTER
I called [facility] because patient should have one refill left on all his meds. She said patient was just late on getting his meds and that he did get all his refills needed earlier cause he came by the pharmacy.

## 2020-08-19 ENCOUNTER — OFFICE VISIT (OUTPATIENT)
Dept: FAMILY MEDICINE | Facility: CLINIC | Age: 70
End: 2020-08-19
Payer: MEDICARE

## 2020-08-19 VITALS
WEIGHT: 201 LBS | SYSTOLIC BLOOD PRESSURE: 120 MMHG | TEMPERATURE: 98 F | HEART RATE: 64 BPM | BODY MASS INDEX: 28.77 KG/M2 | HEIGHT: 70 IN | DIASTOLIC BLOOD PRESSURE: 60 MMHG

## 2020-08-19 DIAGNOSIS — F17.210 CIGARETTE SMOKER: ICD-10-CM

## 2020-08-19 DIAGNOSIS — Z12.2 ENCOUNTER FOR SCREENING FOR MALIGNANT NEOPLASM OF RESPIRATORY ORGANS: ICD-10-CM

## 2020-08-19 DIAGNOSIS — I10 ESSENTIAL HYPERTENSION: ICD-10-CM

## 2020-08-19 DIAGNOSIS — J41.0 SMOKERS' COUGH: ICD-10-CM

## 2020-08-19 DIAGNOSIS — N18.30 CKD (CHRONIC KIDNEY DISEASE) STAGE 3, GFR 30-59 ML/MIN: ICD-10-CM

## 2020-08-19 DIAGNOSIS — E78.5 DYSLIPIDEMIA: ICD-10-CM

## 2020-08-19 DIAGNOSIS — K21.9 GASTROESOPHAGEAL REFLUX DISEASE, ESOPHAGITIS PRESENCE NOT SPECIFIED: ICD-10-CM

## 2020-08-19 DIAGNOSIS — R39.11 BENIGN PROSTATIC HYPERPLASIA WITH URINARY HESITANCY: ICD-10-CM

## 2020-08-19 DIAGNOSIS — Z13.6 SCREENING FOR AAA (ABDOMINAL AORTIC ANEURYSM): ICD-10-CM

## 2020-08-19 DIAGNOSIS — N52.9 ERECTILE DYSFUNCTION, UNSPECIFIED ERECTILE DYSFUNCTION TYPE: ICD-10-CM

## 2020-08-19 DIAGNOSIS — Z12.11 COLON CANCER SCREENING: ICD-10-CM

## 2020-08-19 DIAGNOSIS — R97.20 ELEVATED PSA: ICD-10-CM

## 2020-08-19 DIAGNOSIS — N40.1 BENIGN PROSTATIC HYPERPLASIA WITH URINARY HESITANCY: ICD-10-CM

## 2020-08-19 DIAGNOSIS — Z86.73 HISTORY OF CVA (CEREBROVASCULAR ACCIDENT): ICD-10-CM

## 2020-08-19 DIAGNOSIS — E11.42 DIABETIC POLYNEUROPATHY ASSOCIATED WITH TYPE 2 DIABETES MELLITUS: Primary | ICD-10-CM

## 2020-08-19 PROCEDURE — 1101F PT FALLS ASSESS-DOCD LE1/YR: CPT | Mod: S$GLB,,, | Performed by: NURSE PRACTITIONER

## 2020-08-19 PROCEDURE — 3074F SYST BP LT 130 MM HG: CPT | Mod: S$GLB,,, | Performed by: NURSE PRACTITIONER

## 2020-08-19 PROCEDURE — 3078F DIAST BP <80 MM HG: CPT | Mod: S$GLB,,, | Performed by: NURSE PRACTITIONER

## 2020-08-19 PROCEDURE — 3051F PR MOST RECENT HEMOGLOBIN A1C LEVEL 7.0 - < 8.0%: ICD-10-PCS | Mod: S$GLB,,, | Performed by: NURSE PRACTITIONER

## 2020-08-19 PROCEDURE — 3008F BODY MASS INDEX DOCD: CPT | Mod: S$GLB,,, | Performed by: NURSE PRACTITIONER

## 2020-08-19 PROCEDURE — 99406 PR TOBACCO USE CESSATION INTERMEDIATE 3-10 MINUTES: ICD-10-PCS | Mod: S$GLB,,, | Performed by: NURSE PRACTITIONER

## 2020-08-19 PROCEDURE — 99214 PR OFFICE/OUTPT VISIT, EST, LEVL IV, 30-39 MIN: ICD-10-PCS | Mod: S$GLB,,, | Performed by: NURSE PRACTITIONER

## 2020-08-19 PROCEDURE — 1159F PR MEDICATION LIST DOCUMENTED IN MEDICAL RECORD: ICD-10-PCS | Mod: S$GLB,,, | Performed by: NURSE PRACTITIONER

## 2020-08-19 PROCEDURE — 3078F PR MOST RECENT DIASTOLIC BLOOD PRESSURE < 80 MM HG: ICD-10-PCS | Mod: S$GLB,,, | Performed by: NURSE PRACTITIONER

## 2020-08-19 PROCEDURE — 1101F PR PT FALLS ASSESS DOC 0-1 FALLS W/OUT INJ PAST YR: ICD-10-PCS | Mod: S$GLB,,, | Performed by: NURSE PRACTITIONER

## 2020-08-19 PROCEDURE — 99214 OFFICE O/P EST MOD 30 MIN: CPT | Mod: S$GLB,,, | Performed by: NURSE PRACTITIONER

## 2020-08-19 PROCEDURE — 3074F PR MOST RECENT SYSTOLIC BLOOD PRESSURE < 130 MM HG: ICD-10-PCS | Mod: S$GLB,,, | Performed by: NURSE PRACTITIONER

## 2020-08-19 PROCEDURE — 1159F MED LIST DOCD IN RCRD: CPT | Mod: S$GLB,,, | Performed by: NURSE PRACTITIONER

## 2020-08-19 PROCEDURE — 3008F PR BODY MASS INDEX (BMI) DOCUMENTED: ICD-10-PCS | Mod: S$GLB,,, | Performed by: NURSE PRACTITIONER

## 2020-08-19 PROCEDURE — 3051F HG A1C>EQUAL 7.0%<8.0%: CPT | Mod: S$GLB,,, | Performed by: NURSE PRACTITIONER

## 2020-08-19 PROCEDURE — 99406 BEHAV CHNG SMOKING 3-10 MIN: CPT | Mod: S$GLB,,, | Performed by: NURSE PRACTITIONER

## 2020-08-19 RX ORDER — GABAPENTIN 100 MG/1
CAPSULE ORAL
Qty: 180 CAPSULE | Refills: 1 | Status: SHIPPED | OUTPATIENT
Start: 2020-08-19 | End: 2020-11-16 | Stop reason: SDUPTHER

## 2020-08-19 RX ORDER — TAMSULOSIN HYDROCHLORIDE 0.4 MG/1
0.4 CAPSULE ORAL DAILY
Qty: 30 CAPSULE | Refills: 11 | Status: SHIPPED | OUTPATIENT
Start: 2020-08-19 | End: 2021-03-08 | Stop reason: SDUPTHER

## 2020-08-19 RX ORDER — LISINOPRIL 10 MG/1
10 TABLET ORAL DAILY
Qty: 90 TABLET | Refills: 1 | Status: SHIPPED | OUTPATIENT
Start: 2020-08-19 | End: 2020-11-16 | Stop reason: SDUPTHER

## 2020-08-19 RX ORDER — METFORMIN HYDROCHLORIDE 500 MG/1
500 TABLET, EXTENDED RELEASE ORAL
Qty: 90 TABLET | Refills: 1 | Status: SHIPPED | OUTPATIENT
Start: 2020-08-19 | End: 2020-11-16 | Stop reason: SDUPTHER

## 2020-08-19 RX ORDER — UMECLIDINIUM BROMIDE AND VILANTEROL TRIFENATATE 62.5; 25 UG/1; UG/1
1 POWDER RESPIRATORY (INHALATION) DAILY
Qty: 1 EACH | Refills: 3 | Status: SHIPPED | OUTPATIENT
Start: 2020-08-19 | End: 2021-03-08 | Stop reason: SDUPTHER

## 2020-08-19 RX ORDER — METOPROLOL TARTRATE 50 MG/1
50 TABLET ORAL 2 TIMES DAILY
Qty: 180 TABLET | Refills: 1 | Status: SHIPPED | OUTPATIENT
Start: 2020-08-19 | End: 2020-11-16 | Stop reason: SDUPTHER

## 2020-08-19 RX ORDER — ATORVASTATIN CALCIUM 40 MG/1
40 TABLET, FILM COATED ORAL DAILY
Qty: 90 TABLET | Refills: 1 | Status: SHIPPED | OUTPATIENT
Start: 2020-08-19 | End: 2020-11-16 | Stop reason: SDUPTHER

## 2020-08-19 RX ORDER — CLOPIDOGREL BISULFATE 75 MG/1
75 TABLET ORAL DAILY
Qty: 90 TABLET | Refills: 1 | Status: SHIPPED | OUTPATIENT
Start: 2020-08-19 | End: 2020-11-16 | Stop reason: SDUPTHER

## 2020-08-19 RX ORDER — ALBUTEROL SULFATE 90 UG/1
1-2 AEROSOL, METERED RESPIRATORY (INHALATION) EVERY 6 HOURS PRN
Qty: 18 G | Refills: 2 | Status: ON HOLD | OUTPATIENT
Start: 2020-08-19 | End: 2023-01-24 | Stop reason: SDUPTHER

## 2020-08-19 RX ORDER — PANTOPRAZOLE SODIUM 40 MG/1
40 TABLET, DELAYED RELEASE ORAL DAILY
Qty: 90 TABLET | Refills: 1 | Status: SHIPPED | OUTPATIENT
Start: 2020-08-19 | End: 2020-11-16 | Stop reason: SDUPTHER

## 2020-08-19 NOTE — PROGRESS NOTES
SUBJECTIVE:    Patient ID: Vijay Nguyen is a 70 y.o. male.    Chief Complaint: Follow-up (3mth labs, pt states feels like he can't breath at night when sleeping, brought bottles, C-scope ordered// SW)    Pt here for regular f/u. Reports overall doing okay. States back taking his medication as prescribed.  Still working landscape job, denies any CP or SOB with exertion. Reports when he lays down at night hears wheezing and will often have to sit up and cough up white sputum. Not using anoro or rescue inhaler. Smoking slightly less than 1ppd  Reports no imporvement with ED issues with sildenafil 50-100mg. Also c/oing of urinary hesitancy. Never went and saw urology      Office Visit on 05/19/2020   Component Date Value Ref Range Status    Hemoglobin A1C 06/29/2020 7.3* <5.7 % of total Hgb Final    Glucose 06/29/2020 134  65 - 139 mg/dL Final    BUN, Bld 06/29/2020 18  7 - 25 mg/dL Final    Creatinine 06/29/2020 1.43* 0.70 - 1.18 mg/dL Final    eGFR if non African American 06/29/2020 49* > OR = 60 mL/min/1.73m2 Final    eGFR if African American 06/29/2020 57* > OR = 60 mL/min/1.73m2 Final    BUN/Creatinine Ratio 06/29/2020 13  6 - 22 (calc) Final    Sodium 06/29/2020 135  135 - 146 mmol/L Final    Potassium 06/29/2020 4.4  3.5 - 5.3 mmol/L Final    Chloride 06/29/2020 103  98 - 110 mmol/L Final    CO2 06/29/2020 23  20 - 32 mmol/L Final    Calcium 06/29/2020 9.8  8.6 - 10.3 mg/dL Final    Total Protein 06/29/2020 7.5  6.1 - 8.1 g/dL Final    Albumin 06/29/2020 4.5  3.6 - 5.1 g/dL Final    Globulin, Total 06/29/2020 3.0  1.9 - 3.7 g/dL (calc) Final    Albumin/Globulin Ratio 06/29/2020 1.5  1.0 - 2.5 (calc) Final    Total Bilirubin 06/29/2020 0.4  0.2 - 1.2 mg/dL Final    Alkaline Phosphatase 06/29/2020 68  35 - 144 U/L Final    AST 06/29/2020 19  10 - 35 U/L Final    ALT 06/29/2020 23  9 - 46 U/L Final    Cholesterol 06/29/2020 253* <200 mg/dL Final    HDL 06/29/2020 28* > OR = 40 mg/dL Final     Triglycerides 06/29/2020 230* <150 mg/dL Final    LDL Cholesterol 06/29/2020 182* mg/dL (calc) Final    Hdl/Cholesterol Ratio 06/29/2020 9.0* <5.0 (calc) Final    Non HDL Chol. (LDL+VLDL) 06/29/2020 225* <130 mg/dL (calc) Final    Creatinine, Random Ur 06/29/2020 564* 20 - 320 mg/dL Final    Microalb, Ur 06/29/2020 2.7  See Note: mg/dL Final    Microalb Creat Ratio 06/29/2020 5  <30 mcg/mg creat Final    Color, UA 06/29/2020 DARK YELLOW  YELLOW Final    Appearance, UA 06/29/2020 CLEAR  CLEAR Final    Specific [place], UA 06/29/2020 1.035  1.001 - 1.035 Final    pH, UA 06/29/2020 < OR = 5.0  5.0 - 8.0 Final    Glucose, UA 06/29/2020 NEGATIVE  NEGATIVE Final    Bilirubin, UA 06/29/2020 NEGATIVE  NEGATIVE Final    Ketones, UA 06/29/2020 TRACE* NEGATIVE Final    Occult Blood UA 06/29/2020 NEGATIVE  NEGATIVE Final    Protein, UA 06/29/2020 TRACE* NEGATIVE Final    Nitrite, UA 06/29/2020 NEGATIVE  NEGATIVE Final    Leukocytes, UA 06/29/2020 NEGATIVE  NEGATIVE Final    WBC Casts, UA 06/29/2020 0-5  < OR = 5 /HPF Final    RBC Casts, UA 06/29/2020 0-2  < OR = 2 /HPF Final    Squam Epithel, UA 06/29/2020 0-5  < OR = 5 /HPF Final    Bacteria, UA 06/29/2020 NONE SEEN  NONE SEEN /HPF Final    Hyaline Casts, UA 06/29/2020 0-5* NONE SEEN /LPF Final    Reflexive Urine Culture 06/29/2020 NO CULTURE INDICATED   Final    PROSTATE SPECIFIC ANTIGEN, SCR - Q* 06/29/2020 4.1* < OR = 4.0 ng/mL Final       Past Medical History:   Diagnosis Date    Diabetic polyneuropathy associated with type 2 diabetes mellitus 11/12/2019    Dyslipidemia 11/12/2019    Gastroesophageal reflux disease without esophagitis 11/12/2019    History of CVA (cerebrovascular accident) 11/12/2019    Long-term insulin use 11/12/2019    Smokers' cough 11/12/2019     History reviewed. No pertinent surgical history.  History reviewed. No pertinent family history.    Marital Status: Single  Alcohol History:  reports current alcohol  use.  Tobacco History:  reports that he has been smoking. He has been smoking about 0.25 packs per day. He has never used smokeless tobacco.  Drug History:  reports no history of drug use.    Health Maintenance Topics with due status: Not Due       Topic Last Completion Date    Influenza Vaccine 10/10/2018    Foot Exam 12/04/2019    Eye Exam 01/24/2020    Pneumococcal Vaccine (65+ Low/Medium Risk) 01/27/2020    Lipid Panel 06/29/2020    Hemoglobin A1c 06/29/2020    High Dose Statin 08/19/2020    Aspirin/Antiplatelet Therapy 08/19/2020     Immunization History   Administered Date(s) Administered    Influenza - Trivalent - PF (ADULT) 10/10/2018    Pneumococcal Conjugate - 13 Valent 01/27/2020       Review of patient's allergies indicates:  No Known Allergies    Current Outpatient Medications:     aspirin (ECOTRIN) 81 MG EC tablet, Take 1 tablet (81 mg total) by mouth once daily., Disp: , Rfl:     atorvastatin (LIPITOR) 40 MG tablet, Take 1 tablet (40 mg total) by mouth once daily., Disp: 90 tablet, Rfl: 1    blood sugar diagnostic (TRUE METRIX GLUCOSE TEST STRIP) Strp, as directed, Disp: , Rfl:     clopidogreL (PLAVIX) 75 mg tablet, Take 1 tablet (75 mg total) by mouth once daily., Disp: 90 tablet, Rfl: 1    gabapentin (NEURONTIN) 100 MG capsule, 1-2 capsules at bedtime for neuropathy pain, Disp: 180 capsule, Rfl: 1    lancets (RELIAMED LANCET) 30 gauge Misc, as directed, Disp: , Rfl:     lisinopriL 10 MG tablet, Take 1 tablet (10 mg total) by mouth once daily., Disp: 90 tablet, Rfl: 1    metFORMIN (GLUCOPHAGE-XR) 500 MG ER 24hr tablet, Take 1 tablet (500 mg total) by mouth daily with breakfast., Disp: 90 tablet, Rfl: 1    metoprolol tartrate (LOPRESSOR) 50 MG tablet, Take 1 tablet (50 mg total) by mouth 2 (two) times daily., Disp: 180 tablet, Rfl: 1    pantoprazole (PROTONIX) 40 MG tablet, Take 1 tablet (40 mg total) by mouth once daily., Disp: 90 tablet, Rfl: 1    umeclidinium-vilanteroL (ANORO ELLIPTA)  "62.5-25 mcg/actuation DsDv, Inhale 1 puff into the lungs once daily. Controller, Disp: 1 each, Rfl: 3    albuterol (PROAIR HFA) 90 mcg/actuation inhaler, Inhale 1-2 puffs into the lungs every 6 (six) hours as needed for Wheezing. Rescue, Disp: 18 g, Rfl: 2    tamsulosin (FLOMAX) 0.4 mg Cap, Take 1 capsule (0.4 mg total) by mouth once daily. Take in evening after supper, Disp: 30 capsule, Rfl: 11    Review of Systems   Constitutional: Negative for chills and fever.   Respiratory: Positive for wheezing (at night). Negative for cough and shortness of breath.    Cardiovascular: Negative for chest pain, palpitations and leg swelling.   Gastrointestinal: Negative for abdominal pain, constipation and diarrhea.   Genitourinary: Positive for difficulty urinating. Negative for dysuria, hematuria, scrotal swelling and testicular pain.   Musculoskeletal: Positive for back pain (across lower back) and neck stiffness.   Skin: Negative for rash and wound.   Neurological: Positive for numbness (Bilateral feet). Negative for dizziness and headaches.          Objective:      Vitals:    08/19/20 1417   BP: 120/60   Pulse: 64   Temp: 98.4 °F (36.9 °C)   Weight: 91.2 kg (201 lb)   Height: 5' 10" (1.778 m)     Physical Exam  Vitals signs and nursing note reviewed.   Constitutional:       General: He is not in acute distress.     Appearance: Normal appearance. He is well-developed.   HENT:      Head: Normocephalic and atraumatic.      Right Ear: Tympanic membrane and ear canal normal.      Left Ear: Tympanic membrane and ear canal normal.      Mouth/Throat:      Pharynx: No posterior oropharyngeal erythema.   Neck:      Musculoskeletal: Neck supple.      Vascular: No carotid bruit.   Cardiovascular:      Rate and Rhythm: Normal rate and regular rhythm.      Heart sounds: No murmur. No friction rub. No gallop.    Pulmonary:      Effort: Pulmonary effort is normal. No respiratory distress.      Breath sounds: No wheezing or rales.      " Comments: Slightly diminished throughout  Abdominal:      General: There is no distension.      Palpations: Abdomen is soft.      Tenderness: There is no abdominal tenderness.   Musculoskeletal:      Right lower leg: No edema.      Left lower leg: No edema.   Lymphadenopathy:      Cervical: No cervical adenopathy.   Skin:     General: Skin is warm and dry.      Findings: No rash.   Neurological:      General: No focal deficit present.      Mental Status: He is alert and oriented to person, place, and time.      Gait: Gait normal.           Assessment:       1. Diabetic polyneuropathy associated with type 2 diabetes mellitus    2. Essential hypertension    3. CKD (chronic kidney disease) stage 3, GFR 30-59 ml/min    4. Dyslipidemia    5. Elevated PSA    6. Gastroesophageal reflux disease, esophagitis presence not specified    7. Benign prostatic hyperplasia with urinary hesitancy    8. Erectile dysfunction, unspecified erectile dysfunction type    9. Smokers' cough    10. Colon cancer screening    11. Screening for AAA (abdominal aortic aneurysm)    12. History of CVA (cerebrovascular accident)    13. Encounter for screening for malignant neoplasm of respiratory organs    14. Cigarette smoker           Plan:       Diabetic polyneuropathy associated with type 2 diabetes mellitus  Comments:  reviewed last A1C 7.3%- now back on metformin once a day- recheck A1C before next visit  Orders:  -     gabapentin (NEURONTIN) 100 MG capsule; 1-2 capsules at bedtime for neuropathy pain  Dispense: 180 capsule; Refill: 1  -     metFORMIN (GLUCOPHAGE-XR) 500 MG ER 24hr tablet; Take 1 tablet (500 mg total) by mouth daily with breakfast.  Dispense: 90 tablet; Refill: 1    Essential hypertension  Comments:  BP well controlled  Orders:  -     lisinopriL 10 MG tablet; Take 1 tablet (10 mg total) by mouth once daily.  Dispense: 90 tablet; Refill: 1  -     metoprolol tartrate (LOPRESSOR) 50 MG tablet; Take 1 tablet (50 mg total) by mouth 2  (two) times daily.  Dispense: 180 tablet; Refill: 1    CKD (chronic kidney disease) stage 3, GFR 30-59 ml/min  Comments:  reviewed last labs wtih slight decline in GFR- stressed importance of hydration and DM/BP control. avoid NSAIDS    Dyslipidemia  Comments:  uncontrolled on last labs though now back on statin  Orders:  -     atorvastatin (LIPITOR) 40 MG tablet; Take 1 tablet (40 mg total) by mouth once daily.  Dispense: 90 tablet; Refill: 1    Elevated PSA  Comments:  given ED c/o's and slightly elevated PSA advised to see urology, he had already been referred but never scheduled appt    Gastroesophageal reflux disease, esophagitis presence not specified  Comments:  stable  Orders:  -     pantoprazole (PROTONIX) 40 MG tablet; Take 1 tablet (40 mg total) by mouth once daily.  Dispense: 90 tablet; Refill: 1    Benign prostatic hyperplasia with urinary hesitancy  Comments:  will add flomax given sxs, see urology  Orders:  -     tamsulosin (FLOMAX) 0.4 mg Cap; Take 1 capsule (0.4 mg total) by mouth once daily. Take in evening after supper  Dispense: 30 capsule; Refill: 11    Erectile dysfunction, unspecified erectile dysfunction type  Comments:  no help wtih sildenafil, refer to urology    Smokers' cough  Comments:  pt advised he needs to be using anoro daily and will add resuce inhaler prn- stressed importance of smoking cessation- also needs to have LDCT  Orders:  -     umeclidinium-vilanteroL (ANORO ELLIPTA) 62.5-25 mcg/actuation DsDv; Inhale 1 puff into the lungs once daily. Controller  Dispense: 1 each; Refill: 3  -     albuterol (PROAIR HFA) 90 mcg/actuation inhaler; Inhale 1-2 puffs into the lungs every 6 (six) hours as needed for Wheezing. Rescue  Dispense: 18 g; Refill: 2    Colon cancer screening  -     Ambulatory referral/consult to Gastroenterology; Future; Expected date: 08/26/2020    Screening for AAA (abdominal aortic aneurysm)  -      Abdominal Aorta; Future; Expected date: 08/19/2020    History of  CVA (cerebrovascular accident)  -     clopidogreL (PLAVIX) 75 mg tablet; Take 1 tablet (75 mg total) by mouth once daily.  Dispense: 90 tablet; Refill: 1    Encounter for screening for malignant neoplasm of respiratory organs  -     CT Chest Lung Screening Low Dose; Future; Expected date: 08/19/2020    Cigarette smoker  -     US Abdominal Aorta; Future; Expected date: 08/19/2020  -     CT Chest Lung Screening Low Dose; Future; Expected date: 08/19/2020    Assistance with smoking cessation was offered, including:  [x]  Medications  [x]  Counseling  []  Printed Information on Smoking Cessation  [x]  Referral to a Smoking Cessation Program    Patient was counseled regarding smoking for 3-10 minutes. Pt not interested in quitting smoking    Follow up in about 3 months (around 11/19/2020) for Diabetes, HTN, Labs before next visit.        8/19/2020 Trista Alcala NP

## 2020-08-27 ENCOUNTER — HOSPITAL ENCOUNTER (OUTPATIENT)
Dept: RADIOLOGY | Facility: HOSPITAL | Age: 70
Discharge: HOME | End: 2020-08-27
Attending: NURSE PRACTITIONER
Payer: MEDICARE

## 2020-08-27 ENCOUNTER — TELEPHONE (OUTPATIENT)
Dept: FAMILY MEDICINE | Facility: CLINIC | Age: 70
End: 2020-08-27

## 2020-08-27 DIAGNOSIS — Z12.2 ENCOUNTER FOR SCREENING FOR MALIGNANT NEOPLASM OF RESPIRATORY ORGANS: ICD-10-CM

## 2020-08-27 DIAGNOSIS — Z13.6 SCREENING FOR AAA (ABDOMINAL AORTIC ANEURYSM): ICD-10-CM

## 2020-08-27 DIAGNOSIS — F17.210 CIGARETTE SMOKER: ICD-10-CM

## 2020-08-27 PROCEDURE — 76706 US ABDL AORTA SCREEN AAA: CPT | Mod: TC,PO

## 2020-08-27 PROCEDURE — G0297 LDCT FOR LUNG CA SCREEN: HCPCS | Mod: TC,PO

## 2020-08-27 NOTE — TELEPHONE ENCOUNTER
----- Message from Trista Alcala NP sent at 8/27/2020  2:10 PM CDT -----  Please call patient and let him know CT scan of the chest shows mild emphysema, no lung masses or nodules.  There is calcification noted around the heart arteries.  Strongly recommend he cut back or quit smoking

## 2020-08-27 NOTE — PROGRESS NOTES
Please call patient let him know ultrasound was negative for abdominal aortic aneurysm.  There is fatty infiltration of the liver noted- Recommend low-fat diet and regular exercise

## 2020-08-27 NOTE — PROGRESS NOTES
Please call patient and let him know CT scan of the chest shows mild emphysema, no lung masses or nodules.  There is calcification noted around the heart arteries.  Strongly recommend he cut back or quit smoking

## 2020-08-27 NOTE — TELEPHONE ENCOUNTER
Please call patient and let him know CT scan of the chest shows mild emphysema, no lung masses or nodules.  There is calcification noted around the heart arteries.  Strongly recommend he cut back or quit smoking ----- Message from Trista Alcala NP sent at 8/27/2020  2:08 PM CDT -----  Please call patient let him know ultrasound was negative for abdominal aortic aneurysm.  There is fatty infiltration of the liver noted- Recommend low-fat diet and regular exercise

## 2020-11-16 ENCOUNTER — OFFICE VISIT (OUTPATIENT)
Dept: FAMILY MEDICINE | Facility: CLINIC | Age: 70
End: 2020-11-16
Payer: MEDICARE

## 2020-11-16 VITALS
BODY MASS INDEX: 28.49 KG/M2 | SYSTOLIC BLOOD PRESSURE: 132 MMHG | DIASTOLIC BLOOD PRESSURE: 68 MMHG | TEMPERATURE: 98 F | HEART RATE: 64 BPM | WEIGHT: 199 LBS | HEIGHT: 70 IN

## 2020-11-16 DIAGNOSIS — K21.9 GASTROESOPHAGEAL REFLUX DISEASE, UNSPECIFIED WHETHER ESOPHAGITIS PRESENT: ICD-10-CM

## 2020-11-16 DIAGNOSIS — I10 ESSENTIAL HYPERTENSION: ICD-10-CM

## 2020-11-16 DIAGNOSIS — E11.42 DIABETIC POLYNEUROPATHY ASSOCIATED WITH TYPE 2 DIABETES MELLITUS: Primary | ICD-10-CM

## 2020-11-16 DIAGNOSIS — E78.5 DYSLIPIDEMIA: ICD-10-CM

## 2020-11-16 DIAGNOSIS — F17.210 CIGARETTE SMOKER: ICD-10-CM

## 2020-11-16 DIAGNOSIS — Z12.11 COLON CANCER SCREENING: ICD-10-CM

## 2020-11-16 DIAGNOSIS — Z86.73 HISTORY OF CVA (CEREBROVASCULAR ACCIDENT): ICD-10-CM

## 2020-11-16 DIAGNOSIS — K59.00 CONSTIPATION, UNSPECIFIED CONSTIPATION TYPE: ICD-10-CM

## 2020-11-16 DIAGNOSIS — Z23 NEED FOR INFLUENZA VACCINATION: ICD-10-CM

## 2020-11-16 PROCEDURE — G0008 FLU VACCINE - QUADRIVALENT - HIGH DOSE (65+) PRESERVATIVE FREE IM: ICD-10-PCS | Mod: S$GLB,,, | Performed by: NURSE PRACTITIONER

## 2020-11-16 PROCEDURE — 3075F SYST BP GE 130 - 139MM HG: CPT | Mod: S$GLB,,, | Performed by: NURSE PRACTITIONER

## 2020-11-16 PROCEDURE — 3051F PR MOST RECENT HEMOGLOBIN A1C LEVEL 7.0 - < 8.0%: ICD-10-PCS | Mod: S$GLB,,, | Performed by: NURSE PRACTITIONER

## 2020-11-16 PROCEDURE — 1159F PR MEDICATION LIST DOCUMENTED IN MEDICAL RECORD: ICD-10-PCS | Mod: S$GLB,,, | Performed by: NURSE PRACTITIONER

## 2020-11-16 PROCEDURE — 3008F BODY MASS INDEX DOCD: CPT | Mod: S$GLB,,, | Performed by: NURSE PRACTITIONER

## 2020-11-16 PROCEDURE — 3008F PR BODY MASS INDEX (BMI) DOCUMENTED: ICD-10-PCS | Mod: S$GLB,,, | Performed by: NURSE PRACTITIONER

## 2020-11-16 PROCEDURE — 3078F DIAST BP <80 MM HG: CPT | Mod: S$GLB,,, | Performed by: NURSE PRACTITIONER

## 2020-11-16 PROCEDURE — 90662 FLU VACCINE - QUADRIVALENT - HIGH DOSE (65+) PRESERVATIVE FREE IM: ICD-10-PCS | Mod: S$GLB,,, | Performed by: NURSE PRACTITIONER

## 2020-11-16 PROCEDURE — 90662 IIV NO PRSV INCREASED AG IM: CPT | Mod: S$GLB,,, | Performed by: NURSE PRACTITIONER

## 2020-11-16 PROCEDURE — G0008 ADMIN INFLUENZA VIRUS VAC: HCPCS | Mod: S$GLB,,, | Performed by: NURSE PRACTITIONER

## 2020-11-16 PROCEDURE — 99214 PR OFFICE/OUTPT VISIT, EST, LEVL IV, 30-39 MIN: ICD-10-PCS | Mod: 25,S$GLB,, | Performed by: NURSE PRACTITIONER

## 2020-11-16 PROCEDURE — 3078F PR MOST RECENT DIASTOLIC BLOOD PRESSURE < 80 MM HG: ICD-10-PCS | Mod: S$GLB,,, | Performed by: NURSE PRACTITIONER

## 2020-11-16 PROCEDURE — 3051F HG A1C>EQUAL 7.0%<8.0%: CPT | Mod: S$GLB,,, | Performed by: NURSE PRACTITIONER

## 2020-11-16 PROCEDURE — 99214 OFFICE O/P EST MOD 30 MIN: CPT | Mod: 25,S$GLB,, | Performed by: NURSE PRACTITIONER

## 2020-11-16 PROCEDURE — 1159F MED LIST DOCD IN RCRD: CPT | Mod: S$GLB,,, | Performed by: NURSE PRACTITIONER

## 2020-11-16 PROCEDURE — 3075F PR MOST RECENT SYSTOLIC BLOOD PRESS GE 130-139MM HG: ICD-10-PCS | Mod: S$GLB,,, | Performed by: NURSE PRACTITIONER

## 2020-11-16 RX ORDER — CLOPIDOGREL BISULFATE 75 MG/1
75 TABLET ORAL DAILY
Qty: 90 TABLET | Refills: 1 | Status: ON HOLD | OUTPATIENT
Start: 2020-11-16 | End: 2021-05-18

## 2020-11-16 RX ORDER — GABAPENTIN 100 MG/1
CAPSULE ORAL
Qty: 180 CAPSULE | Refills: 1 | Status: SHIPPED | OUTPATIENT
Start: 2020-11-16 | End: 2021-03-08 | Stop reason: SDUPTHER

## 2020-11-16 RX ORDER — METFORMIN HYDROCHLORIDE 500 MG/1
500 TABLET, EXTENDED RELEASE ORAL
Qty: 90 TABLET | Refills: 1 | Status: SHIPPED | OUTPATIENT
Start: 2020-11-16 | End: 2020-11-23

## 2020-11-16 RX ORDER — LISINOPRIL 10 MG/1
10 TABLET ORAL DAILY
Qty: 90 TABLET | Refills: 1 | Status: SHIPPED | OUTPATIENT
Start: 2020-11-16 | End: 2021-07-16 | Stop reason: SDUPTHER

## 2020-11-16 RX ORDER — METOPROLOL TARTRATE 50 MG/1
50 TABLET ORAL 2 TIMES DAILY
Qty: 180 TABLET | Refills: 1 | Status: SHIPPED | OUTPATIENT
Start: 2020-11-16 | End: 2021-11-05 | Stop reason: SDUPTHER

## 2020-11-16 RX ORDER — PANTOPRAZOLE SODIUM 40 MG/1
40 TABLET, DELAYED RELEASE ORAL DAILY
Qty: 90 TABLET | Refills: 1 | Status: SHIPPED | OUTPATIENT
Start: 2020-11-16 | End: 2021-10-27 | Stop reason: SDUPTHER

## 2020-11-16 RX ORDER — ATORVASTATIN CALCIUM 40 MG/1
40 TABLET, FILM COATED ORAL DAILY
Qty: 90 TABLET | Refills: 1 | Status: SHIPPED | OUTPATIENT
Start: 2020-11-16 | End: 2021-10-27 | Stop reason: SDUPTHER

## 2020-11-16 RX ORDER — POLYETHYLENE GLYCOL 3350 17 G/17G
17 POWDER, FOR SOLUTION ORAL DAILY
Refills: 0 | COMMUNITY
Start: 2020-11-16 | End: 2022-01-24

## 2020-11-16 NOTE — PROGRESS NOTES
SUBJECTIVE:    Patient ID: Vijay Nguyen is a 70 y.o. male.    Chief Complaint: Diabetes (HTN, brought bottles, Flu wants// SW)    Pt here for regular f/u. Pt extremely poor historian. Brings in med bottles but really doesn't know what he's taking. Flomax added at last visit and he reports it helped but he never refilled it after the first 30 days so now getting up 4-5 times/night. Never scheduled appt with urology or GI for colon CA screening- pt reports sister has hx of colon CA.  Still smoking 1/2ppd- only uses anoro occasionally.      Office Visit on 05/19/2020   Component Date Value Ref Range Status    Hemoglobin A1C 06/29/2020 7.3* <5.7 % of total Hgb Final    Glucose 06/29/2020 134  65 - 139 mg/dL Final    BUN 06/29/2020 18  7 - 25 mg/dL Final    Creatinine 06/29/2020 1.43* 0.70 - 1.18 mg/dL Final    eGFR if non African American 06/29/2020 49* > OR = 60 mL/min/1.73m2 Final    eGFR if African American 06/29/2020 57* > OR = 60 mL/min/1.73m2 Final    BUN/Creatinine Ratio 06/29/2020 13  6 - 22 (calc) Final    Sodium 06/29/2020 135  135 - 146 mmol/L Final    Potassium 06/29/2020 4.4  3.5 - 5.3 mmol/L Final    Chloride 06/29/2020 103  98 - 110 mmol/L Final    CO2 06/29/2020 23  20 - 32 mmol/L Final    Calcium 06/29/2020 9.8  8.6 - 10.3 mg/dL Final    Total Protein 06/29/2020 7.5  6.1 - 8.1 g/dL Final    Albumin 06/29/2020 4.5  3.6 - 5.1 g/dL Final    Globulin, Total 06/29/2020 3.0  1.9 - 3.7 g/dL (calc) Final    Albumin/Globulin Ratio 06/29/2020 1.5  1.0 - 2.5 (calc) Final    Total Bilirubin 06/29/2020 0.4  0.2 - 1.2 mg/dL Final    Alkaline Phosphatase 06/29/2020 68  35 - 144 U/L Final    AST 06/29/2020 19  10 - 35 U/L Final    ALT 06/29/2020 23  9 - 46 U/L Final    Cholesterol 06/29/2020 253* <200 mg/dL Final    HDL 06/29/2020 28* > OR = 40 mg/dL Final    Triglycerides 06/29/2020 230* <150 mg/dL Final    LDL Cholesterol 06/29/2020 182* mg/dL (calc) Final    HDL/Cholesterol Ratio  06/29/2020 9.0* <5.0 (calc) Final    Non HDL Chol. (LDL+VLDL) 06/29/2020 225* <130 mg/dL (calc) Final    Creatinine, Urine 06/29/2020 564* 20 - 320 mg/dL Final    Microalb, Ur 06/29/2020 2.7  See Note: mg/dL Final    Microalb/Creat Ratio 06/29/2020 5  <30 mcg/mg creat Final    Color, UA 06/29/2020 DARK YELLOW  YELLOW Final    Appearance, UA 06/29/2020 CLEAR  CLEAR Final    Specific [place], UA 06/29/2020 1.035  1.001 - 1.035 Final    pH, UA 06/29/2020 < OR = 5.0  5.0 - 8.0 Final    Glucose, UA 06/29/2020 NEGATIVE  NEGATIVE Final    Bilirubin, UA 06/29/2020 NEGATIVE  NEGATIVE Final    Ketones, UA 06/29/2020 TRACE* NEGATIVE Final    Occult Blood UA 06/29/2020 NEGATIVE  NEGATIVE Final    Protein, UA 06/29/2020 TRACE* NEGATIVE Final    Nitrite, UA 06/29/2020 NEGATIVE  NEGATIVE Final    Leukocytes, UA 06/29/2020 NEGATIVE  NEGATIVE Final    WBC Casts, UA 06/29/2020 0-5  < OR = 5 /HPF Final    RBC Casts, UA 06/29/2020 0-2  < OR = 2 /HPF Final    Squam Epithel, UA 06/29/2020 0-5  < OR = 5 /HPF Final    Bacteria, UA 06/29/2020 NONE SEEN  NONE SEEN /HPF Final    Hyaline Casts, UA 06/29/2020 0-5* NONE SEEN /LPF Final    Reflexive Urine Culture 06/29/2020 NO CULTURE INDICATED   Final    PROSTATE SPECIFIC ANTIGEN, SCR - Q* 06/29/2020 4.1* < OR = 4.0 ng/mL Final       Past Medical History:   Diagnosis Date    Diabetic polyneuropathy associated with type 2 diabetes mellitus 11/12/2019    Dyslipidemia 11/12/2019    Gastroesophageal reflux disease without esophagitis 11/12/2019    History of CVA (cerebrovascular accident) 11/12/2019    Long-term insulin use 11/12/2019    Smokers' cough 11/12/2019     History reviewed. No pertinent surgical history.  History reviewed. No pertinent family history.    Marital Status: Single  Alcohol History:  reports current alcohol use.  Tobacco History:  reports that he has been smoking. He has been smoking about 0.50 packs per day. He has never used smokeless  tobacco.  Drug History:  reports no history of drug use.    Health Maintenance Topics with due status: Not Due       Topic Last Completion Date    Eye Exam 01/24/2020    Pneumococcal Vaccine (65+ Low/Medium Risk) 01/27/2020    Lipid Panel 06/29/2020    Hemoglobin A1c 06/29/2020    High Dose Statin 11/16/2020    Aspirin/Antiplatelet Therapy 11/16/2020     Immunization History   Administered Date(s) Administered    Influenza - Quadrivalent - High Dose - PF (65 years and older) 11/16/2020    Influenza - Trivalent - PF (ADULT) 10/10/2018    Pneumococcal Conjugate - 13 Valent 01/27/2020       Review of patient's allergies indicates:  No Known Allergies    Current Outpatient Medications:     albuterol (PROAIR HFA) 90 mcg/actuation inhaler, Inhale 1-2 puffs into the lungs every 6 (six) hours as needed for Wheezing. Rescue, Disp: 18 g, Rfl: 2    aspirin (ECOTRIN) 81 MG EC tablet, Take 1 tablet (81 mg total) by mouth once daily., Disp: , Rfl:     atorvastatin (LIPITOR) 40 MG tablet, Take 1 tablet (40 mg total) by mouth once daily., Disp: 90 tablet, Rfl: 1    blood sugar diagnostic (TRUE METRIX GLUCOSE TEST STRIP) Strp, as directed, Disp: , Rfl:     clopidogreL (PLAVIX) 75 mg tablet, Take 1 tablet (75 mg total) by mouth once daily., Disp: 90 tablet, Rfl: 1    lancets (RELIAMED LANCET) 30 gauge Misc, as directed, Disp: , Rfl:     metFORMIN (GLUCOPHAGE-XR) 500 MG ER 24hr tablet, Take 1 tablet (500 mg total) by mouth daily with breakfast., Disp: 90 tablet, Rfl: 1    metoprolol tartrate (LOPRESSOR) 50 MG tablet, Take 1 tablet (50 mg total) by mouth 2 (two) times daily., Disp: 180 tablet, Rfl: 1    tamsulosin (FLOMAX) 0.4 mg Cap, Take 1 capsule (0.4 mg total) by mouth once daily. Take in evening after supper, Disp: 30 capsule, Rfl: 11    umeclidinium-vilanteroL (ANORO ELLIPTA) 62.5-25 mcg/actuation DsDv, Inhale 1 puff into the lungs once daily. Controller, Disp: 1 each, Rfl: 3    gabapentin (NEURONTIN) 100 MG  "capsule, 1-2 capsules at bedtime for neuropathy pain, Disp: 180 capsule, Rfl: 1    lisinopriL 10 MG tablet, Take 1 tablet (10 mg total) by mouth once daily., Disp: 90 tablet, Rfl: 1    pantoprazole (PROTONIX) 40 MG tablet, Take 1 tablet (40 mg total) by mouth once daily., Disp: 90 tablet, Rfl: 1    polyethylene glycol (GLYCOLAX) 17 gram PwPk, Take 17 g by mouth once daily., Disp: , Rfl: 0    Review of Systems   Constitutional: Negative for chills, fever and unexpected weight change.   HENT: Negative for trouble swallowing.    Eyes: Negative for visual disturbance.   Respiratory: Positive for wheezing (at night, doesn't use albuterol INH). Negative for cough and shortness of breath.    Cardiovascular: Negative for chest pain, palpitations and leg swelling.   Gastrointestinal: Positive for constipation. Negative for abdominal pain, blood in stool, diarrhea, nausea and vomiting.   Genitourinary: Positive for difficulty urinating and frequency (4-5 times/night). Negative for dysuria, hematuria, scrotal swelling and testicular pain.   Musculoskeletal: Positive for back pain (across lower back) and neck stiffness.   Skin: Negative for rash and wound.   Neurological: Positive for numbness (Bilateral feet). Negative for dizziness and headaches.   Psychiatric/Behavioral: Negative for dysphoric mood. The patient is not nervous/anxious.           Objective:      Vitals:    11/16/20 1429   BP: 132/68   Pulse: 64   Temp: 97.9 °F (36.6 °C)   Weight: 90.3 kg (199 lb)   Height: 5' 10" (1.778 m)     Physical Exam  Vitals signs and nursing note reviewed.   Constitutional:       General: He is not in acute distress.     Appearance: Normal appearance. He is well-developed.   HENT:      Head: Normocephalic and atraumatic.      Right Ear: Tympanic membrane and ear canal normal.      Left Ear: Tympanic membrane and ear canal normal.      Mouth/Throat:      Pharynx: No posterior oropharyngeal erythema.   Neck:      Musculoskeletal: Neck " supple.      Vascular: No carotid bruit.   Cardiovascular:      Rate and Rhythm: Normal rate and regular rhythm.      Heart sounds: No murmur. No friction rub. No gallop.    Pulmonary:      Effort: Pulmonary effort is normal. No respiratory distress.      Breath sounds: No wheezing or rales.      Comments: Slightly diminished throughout  Abdominal:      General: There is no distension.      Palpations: Abdomen is soft.      Tenderness: There is no abdominal tenderness.   Musculoskeletal:      Right lower leg: No edema.      Left lower leg: No edema.   Lymphadenopathy:      Cervical: No cervical adenopathy.   Skin:     General: Skin is warm and dry.      Findings: No rash.   Neurological:      General: No focal deficit present.      Mental Status: He is alert and oriented to person, place, and time.      Gait: Gait normal.           Assessment:       1. Diabetic polyneuropathy associated with type 2 diabetes mellitus    2. Essential hypertension    3. Dyslipidemia    4. History of CVA (cerebrovascular accident)    5. Gastroesophageal reflux disease, unspecified whether esophagitis present    6. Constipation, unspecified constipation type    7. Need for influenza vaccination    8. Colon cancer screening    9. Cigarette smoker           Plan:       Diabetic polyneuropathy associated with type 2 diabetes mellitus  Comments:  needs to have updated A1c drawn  Orders:  -     metFORMIN (GLUCOPHAGE-XR) 500 MG ER 24hr tablet; Take 1 tablet (500 mg total) by mouth daily with breakfast.  Dispense: 90 tablet; Refill: 1  -     gabapentin (NEURONTIN) 100 MG capsule; 1-2 capsules at bedtime for neuropathy pain  Dispense: 180 capsule; Refill: 1  -     Lipid Panel; Future; Expected date: 11/16/2020  -     Hemoglobin A1C; Future; Expected date: 11/16/2020  -     Comprehensive Metabolic Panel; Future; Expected date: 11/16/2020    Essential hypertension  Comments:  BP well controlled  Orders:  -     metoprolol tartrate (LOPRESSOR) 50 MG  tablet; Take 1 tablet (50 mg total) by mouth 2 (two) times daily.  Dispense: 180 tablet; Refill: 1  -     lisinopriL 10 MG tablet; Take 1 tablet (10 mg total) by mouth once daily.  Dispense: 90 tablet; Refill: 1    Dyslipidemia  Comments:  uncontrolled on last labs, reports compliance with statin- updated labs to be drawn today  Orders:  -     atorvastatin (LIPITOR) 40 MG tablet; Take 1 tablet (40 mg total) by mouth once daily.  Dispense: 90 tablet; Refill: 1    History of CVA (cerebrovascular accident)  -     clopidogreL (PLAVIX) 75 mg tablet; Take 1 tablet (75 mg total) by mouth once daily.  Dispense: 90 tablet; Refill: 1    Gastroesophageal reflux disease, unspecified whether esophagitis present  Comments:  stable  Orders:  -     pantoprazole (PROTONIX) 40 MG tablet; Take 1 tablet (40 mg total) by mouth once daily.  Dispense: 90 tablet; Refill: 1    Constipation, unspecified constipation type  Comments:  recommend adding miralax daily however stressed importance of cscope mk given FH of colon CA  Orders:  -     polyethylene glycol (GLYCOLAX) 17 gram PwPk; Take 17 g by mouth once daily.; Refill: 0    Need for influenza vaccination  -     Influenza - Quadrivalent - High Dose (65+) (PF) (IM)    Colon cancer screening    Cigarette smoker  Comments:  Smoking cessation counseling provided and strongly encouraged to quit      Follow up in about 3 months (around 2/16/2021) for Diabetes, COPD, HTN, labs to be drawn today.        11/16/2020 Trista Alcala NP

## 2020-11-17 LAB
ALBUMIN SERPL-MCNC: 4.3 G/DL (ref 3.6–5.1)
ALBUMIN/GLOB SERPL: 1.5 (CALC) (ref 1–2.5)
ALP SERPL-CCNC: 85 U/L (ref 35–144)
ALT SERPL-CCNC: 26 U/L (ref 9–46)
AST SERPL-CCNC: 23 U/L (ref 10–35)
BILIRUB SERPL-MCNC: 0.4 MG/DL (ref 0.2–1.2)
BUN SERPL-MCNC: 11 MG/DL (ref 7–25)
BUN/CREAT SERPL: NORMAL (CALC) (ref 6–22)
CALCIUM SERPL-MCNC: 9.8 MG/DL (ref 8.6–10.3)
CHLORIDE SERPL-SCNC: 103 MMOL/L (ref 98–110)
CHOLEST SERPL-MCNC: 139 MG/DL
CHOLEST/HDLC SERPL: 4.8 (CALC)
CO2 SERPL-SCNC: 26 MMOL/L (ref 20–32)
CREAT SERPL-MCNC: 1.03 MG/DL (ref 0.7–1.18)
GFRSERPLBLD MDRD-ARVRAT: 73 ML/MIN/1.73M2
GLOBULIN SER CALC-MCNC: 2.8 G/DL (CALC) (ref 1.9–3.7)
GLUCOSE SERPL-MCNC: 136 MG/DL (ref 65–139)
HBA1C MFR BLD: 7.2 % OF TOTAL HGB
HDLC SERPL-MCNC: 29 MG/DL
LDLC SERPL CALC-MCNC: 87 MG/DL (CALC)
NONHDLC SERPL-MCNC: 110 MG/DL (CALC)
POTASSIUM SERPL-SCNC: 4.1 MMOL/L (ref 3.5–5.3)
PROT SERPL-MCNC: 7.1 G/DL (ref 6.1–8.1)
SODIUM SERPL-SCNC: 137 MMOL/L (ref 135–146)
TRIGL SERPL-MCNC: 132 MG/DL

## 2020-11-23 ENCOUNTER — TELEPHONE (OUTPATIENT)
Dept: FAMILY MEDICINE | Facility: CLINIC | Age: 70
End: 2020-11-23

## 2020-11-23 DIAGNOSIS — E11.42 DIABETIC POLYNEUROPATHY ASSOCIATED WITH TYPE 2 DIABETES MELLITUS: ICD-10-CM

## 2020-11-23 NOTE — PROGRESS NOTES
Please call pt and let him know diabetes test A1C is still elevated above goal at 7.2%- recommend he increase metformin to twice a day. His cholesterol levels are much improved since labs 4 months ago- continue current medication. Kidney and liver function within normal range. Needs updated A1C in 3 months

## 2020-11-23 NOTE — TELEPHONE ENCOUNTER
----- Message from Trista Alcala NP sent at 11/22/2020  8:24 PM CST -----  Please call pt and let him know diabetes test A1C is still elevated above goal at 7.2%- recommend he increase metformin to twice a day. His cholesterol levels are much improved since labs 4 months ago- continue current medication. Kidney and liver function within normal range. Needs updated A1C in 3 months

## 2020-11-23 NOTE — PROGRESS NOTES
Spoke to patient who understood the message.  Dose adjustment in med list. Sent to Dr Hameed to sign./ba

## 2020-11-23 NOTE — TELEPHONE ENCOUNTER
Spoke to patient who understood the message.  Dose adjustment in med list. Sent to Dr Mcfarland to sign./ba   Therapeutic Plasma Exchange Note    Time Out performed prior to start of procedure.  See scanned Therapeutic Apheresis Procedure Record for details.   See MAR documentation for medications administered.    Procedure Number: A4    Adverse events (None/Specify Event and Management): none    Total Blood Volume Processed: 6098 mL    Fluid given to patient  5% Albumin: 2765 mL  FFP: 0 mL  ACD-A: 84 mL  0.9% NS: 175 mL    Fluid removed from patient  Plasma: 2998 mL    Net Fluid Balance: +26 mL    Patient Disposition: Treatment completed. Rinse back completed. Patient tolerated treatment well. C/O LLQ pain and low back pain, this maybe constipation. C/O pain prior to and after the treatment. Report to floor RN.     Next Therapeutic Plasma Exchange Procedure scheduled for: Friday July 17, 20202    If there are any questions related the procedure please call [facility] at [phone].    24/7 Nurse Pager: [phone]    24/7 Physician Pager: [phone]

## 2020-11-30 RX ORDER — METFORMIN HYDROCHLORIDE 500 MG/1
500 TABLET, EXTENDED RELEASE ORAL 2 TIMES DAILY WITH MEALS
Qty: 180 TABLET | Refills: 1 | Status: SHIPPED | OUTPATIENT
Start: 2020-11-30 | End: 2021-03-08 | Stop reason: SDUPTHER

## 2021-02-09 ENCOUNTER — TELEPHONE (OUTPATIENT)
Dept: FAMILY MEDICINE | Facility: CLINIC | Age: 71
End: 2021-02-09

## 2021-02-09 DIAGNOSIS — Z79.899 ENCOUNTER FOR LONG-TERM (CURRENT) USE OF OTHER MEDICATIONS: ICD-10-CM

## 2021-02-09 DIAGNOSIS — I10 ESSENTIAL HYPERTENSION: ICD-10-CM

## 2021-02-09 DIAGNOSIS — Z00.00 ROUTINE MEDICAL EXAM: Primary | ICD-10-CM

## 2021-02-09 DIAGNOSIS — E11.42 DIABETIC POLYNEUROPATHY ASSOCIATED WITH TYPE 2 DIABETES MELLITUS: ICD-10-CM

## 2021-02-09 DIAGNOSIS — E11.9 TYPE 2 DIABETES MELLITUS WITHOUT COMPLICATION, WITHOUT LONG-TERM CURRENT USE OF INSULIN: ICD-10-CM

## 2021-02-09 DIAGNOSIS — N18.30 CKD (CHRONIC KIDNEY DISEASE) STAGE 3, GFR 30-59 ML/MIN: ICD-10-CM

## 2021-02-23 ENCOUNTER — TELEPHONE (OUTPATIENT)
Dept: FAMILY MEDICINE | Facility: CLINIC | Age: 71
End: 2021-02-23

## 2021-03-04 LAB
ALBUMIN/CREAT UR: 5 MCG/MG CREAT
APPEARANCE UR: CLEAR
BACTERIA #/AREA URNS HPF: NORMAL /HPF
BACTERIA UR CULT: NORMAL
BASOPHILS # BLD AUTO: 38 CELLS/UL (ref 0–200)
BASOPHILS NFR BLD AUTO: 0.6 %
BILIRUB UR QL STRIP: NEGATIVE
COLOR UR: NORMAL
CREAT UR-MCNC: 238 MG/DL (ref 20–320)
EOSINOPHIL # BLD AUTO: 173 CELLS/UL (ref 15–500)
EOSINOPHIL NFR BLD AUTO: 2.7 %
ERYTHROCYTE [DISTWIDTH] IN BLOOD BY AUTOMATED COUNT: 12.9 % (ref 11–15)
GLUCOSE UR QL STRIP: NEGATIVE
HBA1C MFR BLD: 7.9 % OF TOTAL HGB
HCT VFR BLD AUTO: 42.9 % (ref 38.5–50)
HGB BLD-MCNC: 14.1 G/DL (ref 13.2–17.1)
HGB UR QL STRIP: NEGATIVE
HYALINE CASTS #/AREA URNS LPF: NORMAL /LPF
KETONES UR QL STRIP: NEGATIVE
LEUKOCYTE ESTERASE UR QL STRIP: NEGATIVE
LYMPHOCYTES # BLD AUTO: 1446 CELLS/UL (ref 850–3900)
LYMPHOCYTES NFR BLD AUTO: 22.6 %
MCH RBC QN AUTO: 29.3 PG (ref 27–33)
MCHC RBC AUTO-ENTMCNC: 32.9 G/DL (ref 32–36)
MCV RBC AUTO: 89.2 FL (ref 80–100)
MICROALBUMIN UR-MCNC: 1.2 MG/DL
MONOCYTES # BLD AUTO: 429 CELLS/UL (ref 200–950)
MONOCYTES NFR BLD AUTO: 6.7 %
NEUTROPHILS # BLD AUTO: 4314 CELLS/UL (ref 1500–7800)
NEUTROPHILS NFR BLD AUTO: 67.4 %
NITRITE UR QL STRIP: NEGATIVE
PH UR STRIP: 7.5 [PH] (ref 5–8)
PLATELET # BLD AUTO: 229 THOUSAND/UL (ref 140–400)
PMV BLD REES-ECKER: 11.8 FL (ref 7.5–12.5)
PROT UR QL STRIP: NEGATIVE
RBC # BLD AUTO: 4.81 MILLION/UL (ref 4.2–5.8)
RBC #/AREA URNS HPF: NORMAL /HPF
SP GR UR STRIP: 1.02 (ref 1–1.03)
SQUAMOUS #/AREA URNS HPF: NORMAL /HPF
TSH SERPL-ACNC: 1.95 MIU/L (ref 0.4–4.5)
WBC # BLD AUTO: 6.4 THOUSAND/UL (ref 3.8–10.8)
WBC #/AREA URNS HPF: NORMAL /HPF

## 2021-03-08 ENCOUNTER — OFFICE VISIT (OUTPATIENT)
Dept: FAMILY MEDICINE | Facility: CLINIC | Age: 71
End: 2021-03-08
Payer: MEDICARE

## 2021-03-08 VITALS
BODY MASS INDEX: 28.63 KG/M2 | HEART RATE: 74 BPM | SYSTOLIC BLOOD PRESSURE: 130 MMHG | WEIGHT: 200 LBS | HEIGHT: 70 IN | DIASTOLIC BLOOD PRESSURE: 80 MMHG

## 2021-03-08 DIAGNOSIS — N40.1 BENIGN PROSTATIC HYPERPLASIA WITH URINARY HESITANCY: ICD-10-CM

## 2021-03-08 DIAGNOSIS — E78.5 DYSLIPIDEMIA: ICD-10-CM

## 2021-03-08 DIAGNOSIS — J43.9 PULMONARY EMPHYSEMA, UNSPECIFIED EMPHYSEMA TYPE: ICD-10-CM

## 2021-03-08 DIAGNOSIS — Z12.11 COLON CANCER SCREENING: ICD-10-CM

## 2021-03-08 DIAGNOSIS — Z86.73 HISTORY OF CVA (CEREBROVASCULAR ACCIDENT): ICD-10-CM

## 2021-03-08 DIAGNOSIS — E11.42 DIABETIC POLYNEUROPATHY ASSOCIATED WITH TYPE 2 DIABETES MELLITUS: Primary | ICD-10-CM

## 2021-03-08 DIAGNOSIS — F17.210 CIGARETTE SMOKER: ICD-10-CM

## 2021-03-08 DIAGNOSIS — I10 ESSENTIAL HYPERTENSION: ICD-10-CM

## 2021-03-08 DIAGNOSIS — R39.11 BENIGN PROSTATIC HYPERPLASIA WITH URINARY HESITANCY: ICD-10-CM

## 2021-03-08 PROCEDURE — 1101F PT FALLS ASSESS-DOCD LE1/YR: CPT | Mod: S$GLB,,, | Performed by: NURSE PRACTITIONER

## 2021-03-08 PROCEDURE — 1101F PR PT FALLS ASSESS DOC 0-1 FALLS W/OUT INJ PAST YR: ICD-10-PCS | Mod: S$GLB,,, | Performed by: NURSE PRACTITIONER

## 2021-03-08 PROCEDURE — 1159F PR MEDICATION LIST DOCUMENTED IN MEDICAL RECORD: ICD-10-PCS | Mod: S$GLB,,, | Performed by: NURSE PRACTITIONER

## 2021-03-08 PROCEDURE — 3008F PR BODY MASS INDEX (BMI) DOCUMENTED: ICD-10-PCS | Mod: S$GLB,,, | Performed by: NURSE PRACTITIONER

## 2021-03-08 PROCEDURE — 3288F PR FALLS RISK ASSESSMENT DOCUMENTED: ICD-10-PCS | Mod: S$GLB,,, | Performed by: NURSE PRACTITIONER

## 2021-03-08 PROCEDURE — 3075F SYST BP GE 130 - 139MM HG: CPT | Mod: S$GLB,,, | Performed by: NURSE PRACTITIONER

## 2021-03-08 PROCEDURE — 3051F HG A1C>EQUAL 7.0%<8.0%: CPT | Mod: S$GLB,,, | Performed by: NURSE PRACTITIONER

## 2021-03-08 PROCEDURE — 3079F PR MOST RECENT DIASTOLIC BLOOD PRESSURE 80-89 MM HG: ICD-10-PCS | Mod: S$GLB,,, | Performed by: NURSE PRACTITIONER

## 2021-03-08 PROCEDURE — 99214 PR OFFICE/OUTPT VISIT, EST, LEVL IV, 30-39 MIN: ICD-10-PCS | Mod: S$GLB,,, | Performed by: NURSE PRACTITIONER

## 2021-03-08 PROCEDURE — 3008F BODY MASS INDEX DOCD: CPT | Mod: S$GLB,,, | Performed by: NURSE PRACTITIONER

## 2021-03-08 PROCEDURE — 1159F MED LIST DOCD IN RCRD: CPT | Mod: S$GLB,,, | Performed by: NURSE PRACTITIONER

## 2021-03-08 PROCEDURE — 3051F PR MOST RECENT HEMOGLOBIN A1C LEVEL 7.0 - < 8.0%: ICD-10-PCS | Mod: S$GLB,,, | Performed by: NURSE PRACTITIONER

## 2021-03-08 PROCEDURE — 99406 BEHAV CHNG SMOKING 3-10 MIN: CPT | Mod: S$GLB,,, | Performed by: NURSE PRACTITIONER

## 2021-03-08 PROCEDURE — 3288F FALL RISK ASSESSMENT DOCD: CPT | Mod: S$GLB,,, | Performed by: NURSE PRACTITIONER

## 2021-03-08 PROCEDURE — 99406 PR TOBACCO USE CESSATION INTERMEDIATE 3-10 MINUTES: ICD-10-PCS | Mod: S$GLB,,, | Performed by: NURSE PRACTITIONER

## 2021-03-08 PROCEDURE — 3079F DIAST BP 80-89 MM HG: CPT | Mod: S$GLB,,, | Performed by: NURSE PRACTITIONER

## 2021-03-08 PROCEDURE — 3075F PR MOST RECENT SYSTOLIC BLOOD PRESS GE 130-139MM HG: ICD-10-PCS | Mod: S$GLB,,, | Performed by: NURSE PRACTITIONER

## 2021-03-08 PROCEDURE — 99214 OFFICE O/P EST MOD 30 MIN: CPT | Mod: S$GLB,,, | Performed by: NURSE PRACTITIONER

## 2021-03-08 RX ORDER — METFORMIN HYDROCHLORIDE 500 MG/1
1000 TABLET, EXTENDED RELEASE ORAL 2 TIMES DAILY WITH MEALS
Qty: 360 TABLET | Refills: 1 | Status: SHIPPED | OUTPATIENT
Start: 2021-03-08 | End: 2021-07-16 | Stop reason: SDUPTHER

## 2021-03-08 RX ORDER — UMECLIDINIUM BROMIDE AND VILANTEROL TRIFENATATE 62.5; 25 UG/1; UG/1
1 POWDER RESPIRATORY (INHALATION) DAILY
Qty: 180 EACH | Refills: 1 | Status: SHIPPED | OUTPATIENT
Start: 2021-03-08 | End: 2021-07-16 | Stop reason: SDUPTHER

## 2021-03-08 RX ORDER — GABAPENTIN 100 MG/1
CAPSULE ORAL
Qty: 360 CAPSULE | Refills: 1 | Status: SHIPPED | OUTPATIENT
Start: 2021-03-08 | End: 2021-10-27 | Stop reason: SDUPTHER

## 2021-03-08 RX ORDER — TAMSULOSIN HYDROCHLORIDE 0.4 MG/1
0.4 CAPSULE ORAL DAILY
Qty: 90 CAPSULE | Refills: 1 | Status: SHIPPED | OUTPATIENT
Start: 2021-03-08 | End: 2021-07-16 | Stop reason: SDUPTHER

## 2021-03-08 RX ORDER — EMPAGLIFLOZIN 10 MG/1
10 TABLET, FILM COATED ORAL DAILY
Qty: 28 TABLET | Refills: 0 | COMMUNITY
Start: 2021-03-08 | End: 2021-04-08

## 2021-03-27 ENCOUNTER — IMMUNIZATION (OUTPATIENT)
Dept: PRIMARY CARE CLINIC | Facility: CLINIC | Age: 71
End: 2021-03-27
Payer: MEDICARE

## 2021-03-27 DIAGNOSIS — Z23 NEED FOR VACCINATION: Primary | ICD-10-CM

## 2021-03-27 PROCEDURE — 0011A COVID-19, MRNA, LNP-S, PF, 100 MCG/0.5 ML DOSE VACCINE: CPT | Mod: PBBFAC | Performed by: EMERGENCY MEDICINE

## 2021-04-07 ENCOUNTER — TELEPHONE (OUTPATIENT)
Dept: FAMILY MEDICINE | Facility: CLINIC | Age: 71
End: 2021-04-07

## 2021-04-08 ENCOUNTER — OFFICE VISIT (OUTPATIENT)
Dept: FAMILY MEDICINE | Facility: CLINIC | Age: 71
End: 2021-04-08
Payer: MEDICARE

## 2021-04-08 VITALS
HEIGHT: 70 IN | SYSTOLIC BLOOD PRESSURE: 138 MMHG | WEIGHT: 195 LBS | DIASTOLIC BLOOD PRESSURE: 72 MMHG | HEART RATE: 76 BPM | BODY MASS INDEX: 27.92 KG/M2

## 2021-04-08 DIAGNOSIS — N48.1 BALANITIS: Primary | ICD-10-CM

## 2021-04-08 DIAGNOSIS — R39.9 UTI SYMPTOMS: ICD-10-CM

## 2021-04-08 DIAGNOSIS — E11.42 DIABETIC POLYNEUROPATHY ASSOCIATED WITH TYPE 2 DIABETES MELLITUS: ICD-10-CM

## 2021-04-08 LAB
BILIRUB UR QL STRIP: NEGATIVE
GLUCOSE UR QL STRIP: NEGATIVE
KETONES UR QL STRIP: NEGATIVE
LEUKOCYTE ESTERASE UR QL STRIP: NEGATIVE
PH, POC UA: 6
POC BLOOD, URINE: NEGATIVE
POC NITRATES, URINE: NEGATIVE
PROT UR QL STRIP: NEGATIVE
SP GR UR STRIP: 1.01 (ref 1–1.03)
UROBILINOGEN UR STRIP-ACNC: NORMAL (ref 0.3–2.2)

## 2021-04-08 PROCEDURE — 1159F PR MEDICATION LIST DOCUMENTED IN MEDICAL RECORD: ICD-10-PCS | Mod: S$GLB,,, | Performed by: NURSE PRACTITIONER

## 2021-04-08 PROCEDURE — 81003 URINALYSIS AUTO W/O SCOPE: CPT | Mod: QW,S$GLB,, | Performed by: NURSE PRACTITIONER

## 2021-04-08 PROCEDURE — 99213 OFFICE O/P EST LOW 20 MIN: CPT | Mod: 25,S$GLB,, | Performed by: NURSE PRACTITIONER

## 2021-04-08 PROCEDURE — 3051F PR MOST RECENT HEMOGLOBIN A1C LEVEL 7.0 - < 8.0%: ICD-10-PCS | Mod: S$GLB,,, | Performed by: NURSE PRACTITIONER

## 2021-04-08 PROCEDURE — 81003 POCT URINALYSIS, DIPSTICK, AUTOMATED, W/O SCOPE: ICD-10-PCS | Mod: QW,S$GLB,, | Performed by: NURSE PRACTITIONER

## 2021-04-08 PROCEDURE — 3051F HG A1C>EQUAL 7.0%<8.0%: CPT | Mod: S$GLB,,, | Performed by: NURSE PRACTITIONER

## 2021-04-08 PROCEDURE — 3008F PR BODY MASS INDEX (BMI) DOCUMENTED: ICD-10-PCS | Mod: S$GLB,,, | Performed by: NURSE PRACTITIONER

## 2021-04-08 PROCEDURE — 99213 PR OFFICE/OUTPT VISIT, EST, LEVL III, 20-29 MIN: ICD-10-PCS | Mod: 25,S$GLB,, | Performed by: NURSE PRACTITIONER

## 2021-04-08 PROCEDURE — 3008F BODY MASS INDEX DOCD: CPT | Mod: S$GLB,,, | Performed by: NURSE PRACTITIONER

## 2021-04-08 PROCEDURE — 1159F MED LIST DOCD IN RCRD: CPT | Mod: S$GLB,,, | Performed by: NURSE PRACTITIONER

## 2021-04-08 RX ORDER — KETOCONAZOLE 20 MG/G
CREAM TOPICAL 2 TIMES DAILY
Qty: 30 G | Refills: 1 | Status: ON HOLD | OUTPATIENT
Start: 2021-04-08 | End: 2023-01-24 | Stop reason: HOSPADM

## 2021-04-08 RX ORDER — FLUCONAZOLE 150 MG/1
TABLET ORAL
Qty: 2 TABLET | Refills: 0 | Status: SHIPPED | OUTPATIENT
Start: 2021-04-08 | End: 2022-01-24

## 2021-04-15 LAB — NONINV COLON CA DNA+OCC BLD SCRN STL QL: NORMAL

## 2021-05-14 PROBLEM — I21.4 NSTEMI (NON-ST ELEVATED MYOCARDIAL INFARCTION): Status: ACTIVE | Noted: 2021-05-14

## 2021-05-14 PROBLEM — Z72.0 TOBACCO ABUSE: Status: ACTIVE | Noted: 2021-05-14

## 2021-06-28 PROBLEM — Z95.5 S/P DRUG ELUTING CORONARY STENT PLACEMENT: Status: ACTIVE | Noted: 2021-06-28

## 2021-06-28 PROBLEM — I25.10 CORONARY ARTERY DISEASE INVOLVING NATIVE CORONARY ARTERY: Status: ACTIVE | Noted: 2021-06-28

## 2021-07-16 DIAGNOSIS — J43.9 PULMONARY EMPHYSEMA, UNSPECIFIED EMPHYSEMA TYPE: ICD-10-CM

## 2021-07-16 DIAGNOSIS — R39.11 BENIGN PROSTATIC HYPERPLASIA WITH URINARY HESITANCY: ICD-10-CM

## 2021-07-16 DIAGNOSIS — I10 ESSENTIAL HYPERTENSION: ICD-10-CM

## 2021-07-16 DIAGNOSIS — E11.42 DIABETIC POLYNEUROPATHY ASSOCIATED WITH TYPE 2 DIABETES MELLITUS: ICD-10-CM

## 2021-07-16 DIAGNOSIS — N40.1 BENIGN PROSTATIC HYPERPLASIA WITH URINARY HESITANCY: ICD-10-CM

## 2021-07-16 RX ORDER — METFORMIN HYDROCHLORIDE 500 MG/1
1000 TABLET, EXTENDED RELEASE ORAL 2 TIMES DAILY WITH MEALS
Qty: 360 TABLET | Refills: 1 | Status: SHIPPED | OUTPATIENT
Start: 2021-07-16 | End: 2021-10-27 | Stop reason: SDUPTHER

## 2021-07-16 RX ORDER — UMECLIDINIUM BROMIDE AND VILANTEROL TRIFENATATE 62.5; 25 UG/1; UG/1
1 POWDER RESPIRATORY (INHALATION) DAILY
Qty: 180 EACH | Refills: 1 | Status: SHIPPED | OUTPATIENT
Start: 2021-07-16

## 2021-07-16 RX ORDER — LISINOPRIL 10 MG/1
10 TABLET ORAL DAILY
Qty: 90 TABLET | Refills: 1 | Status: SHIPPED | OUTPATIENT
Start: 2021-07-16 | End: 2021-10-27 | Stop reason: SDUPTHER

## 2021-07-16 RX ORDER — TAMSULOSIN HYDROCHLORIDE 0.4 MG/1
0.4 CAPSULE ORAL DAILY
Qty: 90 CAPSULE | Refills: 1 | Status: SHIPPED | OUTPATIENT
Start: 2021-07-16 | End: 2021-10-27 | Stop reason: SDUPTHER

## 2021-10-26 ENCOUNTER — TELEPHONE (OUTPATIENT)
Dept: FAMILY MEDICINE | Facility: CLINIC | Age: 71
End: 2021-10-26
Payer: COMMERCIAL

## 2021-10-27 ENCOUNTER — OFFICE VISIT (OUTPATIENT)
Dept: FAMILY MEDICINE | Facility: CLINIC | Age: 71
End: 2021-10-27
Payer: COMMERCIAL

## 2021-10-27 VITALS
BODY MASS INDEX: 25.79 KG/M2 | HEART RATE: 76 BPM | WEIGHT: 184.19 LBS | HEIGHT: 71 IN | SYSTOLIC BLOOD PRESSURE: 128 MMHG | DIASTOLIC BLOOD PRESSURE: 64 MMHG

## 2021-10-27 DIAGNOSIS — J43.9 PULMONARY EMPHYSEMA, UNSPECIFIED EMPHYSEMA TYPE: ICD-10-CM

## 2021-10-27 DIAGNOSIS — M25.561 CHRONIC PAIN OF RIGHT KNEE: ICD-10-CM

## 2021-10-27 DIAGNOSIS — R63.4 WEIGHT LOSS: ICD-10-CM

## 2021-10-27 DIAGNOSIS — I10 ESSENTIAL HYPERTENSION: ICD-10-CM

## 2021-10-27 DIAGNOSIS — N40.1 BENIGN PROSTATIC HYPERPLASIA WITH URINARY HESITANCY: ICD-10-CM

## 2021-10-27 DIAGNOSIS — Z12.11 COLON CANCER SCREENING: ICD-10-CM

## 2021-10-27 DIAGNOSIS — Z12.5 PROSTATE CANCER SCREENING: ICD-10-CM

## 2021-10-27 DIAGNOSIS — F17.210 NICOTINE DEPENDENCE, CIGARETTES, UNCOMPLICATED: ICD-10-CM

## 2021-10-27 DIAGNOSIS — Z23 NEED FOR INFLUENZA VACCINATION: ICD-10-CM

## 2021-10-27 DIAGNOSIS — E11.42 DIABETIC POLYNEUROPATHY ASSOCIATED WITH TYPE 2 DIABETES MELLITUS: Primary | ICD-10-CM

## 2021-10-27 DIAGNOSIS — K21.9 GASTROESOPHAGEAL REFLUX DISEASE, UNSPECIFIED WHETHER ESOPHAGITIS PRESENT: ICD-10-CM

## 2021-10-27 DIAGNOSIS — R39.11 BENIGN PROSTATIC HYPERPLASIA WITH URINARY HESITANCY: ICD-10-CM

## 2021-10-27 DIAGNOSIS — E78.5 DYSLIPIDEMIA: ICD-10-CM

## 2021-10-27 DIAGNOSIS — Z23 NEEDS FLU SHOT: ICD-10-CM

## 2021-10-27 DIAGNOSIS — G89.29 CHRONIC PAIN OF RIGHT KNEE: ICD-10-CM

## 2021-10-27 LAB — HBA1C MFR BLD: 6.1 %

## 2021-10-27 PROCEDURE — G0008 FLU VACCINE - QUADRIVALENT - HIGH DOSE (65+) PRESERVATIVE FREE IM: ICD-10-PCS | Mod: S$GLB,,, | Performed by: NURSE PRACTITIONER

## 2021-10-27 PROCEDURE — 90662 IIV NO PRSV INCREASED AG IM: CPT | Mod: S$GLB,,, | Performed by: NURSE PRACTITIONER

## 2021-10-27 PROCEDURE — 99214 PR OFFICE/OUTPT VISIT, EST, LEVL IV, 30-39 MIN: ICD-10-PCS | Mod: 25,S$GLB,, | Performed by: NURSE PRACTITIONER

## 2021-10-27 PROCEDURE — 99214 OFFICE O/P EST MOD 30 MIN: CPT | Mod: 25,S$GLB,, | Performed by: NURSE PRACTITIONER

## 2021-10-27 PROCEDURE — G0008 ADMIN INFLUENZA VIRUS VAC: HCPCS | Mod: S$GLB,,, | Performed by: NURSE PRACTITIONER

## 2021-10-27 PROCEDURE — 90662 FLU VACCINE - QUADRIVALENT - HIGH DOSE (65+) PRESERVATIVE FREE IM: ICD-10-PCS | Mod: S$GLB,,, | Performed by: NURSE PRACTITIONER

## 2021-10-27 RX ORDER — METFORMIN HYDROCHLORIDE 500 MG/1
1000 TABLET, EXTENDED RELEASE ORAL 2 TIMES DAILY WITH MEALS
Qty: 360 TABLET | Refills: 1 | Status: ON HOLD | OUTPATIENT
Start: 2021-10-27 | End: 2023-01-24

## 2021-10-27 RX ORDER — ATORVASTATIN CALCIUM 40 MG/1
40 TABLET, FILM COATED ORAL DAILY
Qty: 90 TABLET | Refills: 1 | Status: ON HOLD | OUTPATIENT
Start: 2021-10-27 | End: 2023-01-24

## 2021-10-27 RX ORDER — GABAPENTIN 100 MG/1
CAPSULE ORAL
Qty: 360 CAPSULE | Refills: 1 | Status: SHIPPED | OUTPATIENT
Start: 2021-10-27 | End: 2022-05-06

## 2021-10-27 RX ORDER — PANTOPRAZOLE SODIUM 40 MG/1
40 TABLET, DELAYED RELEASE ORAL DAILY
Qty: 90 TABLET | Refills: 1 | Status: SHIPPED | OUTPATIENT
Start: 2021-10-27 | End: 2022-10-27

## 2021-10-27 RX ORDER — LISINOPRIL 10 MG/1
10 TABLET ORAL DAILY
Qty: 90 TABLET | Refills: 1 | Status: SHIPPED | OUTPATIENT
Start: 2021-10-27 | End: 2022-05-06

## 2021-10-27 RX ORDER — TAMSULOSIN HYDROCHLORIDE 0.4 MG/1
0.4 CAPSULE ORAL DAILY
Qty: 90 CAPSULE | Refills: 1 | Status: SHIPPED | OUTPATIENT
Start: 2021-10-27 | End: 2022-10-27

## 2021-11-04 ENCOUNTER — OFFICE VISIT (OUTPATIENT)
Dept: ORTHOPEDICS | Facility: CLINIC | Age: 71
End: 2021-11-04
Payer: COMMERCIAL

## 2021-11-04 VITALS
DIASTOLIC BLOOD PRESSURE: 60 MMHG | BODY MASS INDEX: 25.76 KG/M2 | WEIGHT: 184 LBS | HEIGHT: 71 IN | SYSTOLIC BLOOD PRESSURE: 140 MMHG

## 2021-11-04 DIAGNOSIS — M17.11 PRIMARY OSTEOARTHRITIS OF RIGHT KNEE: Primary | ICD-10-CM

## 2021-11-04 PROCEDURE — 99203 PR OFFICE/OUTPT VISIT, NEW, LEVL III, 30-44 MIN: ICD-10-PCS | Mod: 25,S$GLB,, | Performed by: ORTHOPAEDIC SURGERY

## 2021-11-04 PROCEDURE — 20610 DRAIN/INJ JOINT/BURSA W/O US: CPT | Mod: RT,S$GLB,, | Performed by: ORTHOPAEDIC SURGERY

## 2021-11-04 PROCEDURE — 99203 OFFICE O/P NEW LOW 30 MIN: CPT | Mod: 25,S$GLB,, | Performed by: ORTHOPAEDIC SURGERY

## 2021-11-04 PROCEDURE — 20610 LARGE JOINT ASPIRATION/INJECTION: R KNEE: ICD-10-PCS | Mod: RT,S$GLB,, | Performed by: ORTHOPAEDIC SURGERY

## 2021-11-04 RX ORDER — TRIAMCINOLONE ACETONIDE 40 MG/ML
40 INJECTION, SUSPENSION INTRA-ARTICULAR; INTRAMUSCULAR
Status: DISCONTINUED | OUTPATIENT
Start: 2021-11-04 | End: 2021-11-04 | Stop reason: HOSPADM

## 2021-11-04 RX ADMIN — TRIAMCINOLONE ACETONIDE 40 MG: 40 INJECTION, SUSPENSION INTRA-ARTICULAR; INTRAMUSCULAR at 01:11

## 2021-11-10 ENCOUNTER — TELEPHONE (OUTPATIENT)
Dept: FAMILY MEDICINE | Facility: CLINIC | Age: 71
End: 2021-11-10
Payer: COMMERCIAL

## 2021-11-10 DIAGNOSIS — F17.210 NICOTINE DEPENDENCE, CIGARETTES, UNCOMPLICATED: ICD-10-CM

## 2021-11-10 DIAGNOSIS — R63.4 UNEXPLAINED WEIGHT LOSS: Primary | ICD-10-CM

## 2021-11-10 DIAGNOSIS — J43.9 PULMONARY EMPHYSEMA, UNSPECIFIED EMPHYSEMA TYPE: ICD-10-CM

## 2021-12-02 ENCOUNTER — OFFICE VISIT (OUTPATIENT)
Dept: ORTHOPEDICS | Facility: CLINIC | Age: 71
End: 2021-12-02
Payer: COMMERCIAL

## 2021-12-02 VITALS
SYSTOLIC BLOOD PRESSURE: 133 MMHG | BODY MASS INDEX: 25.76 KG/M2 | DIASTOLIC BLOOD PRESSURE: 81 MMHG | HEIGHT: 71 IN | WEIGHT: 184 LBS | HEART RATE: 77 BPM

## 2021-12-02 DIAGNOSIS — S83.241A TEAR OF MEDIAL MENISCUS OF RIGHT KNEE, CURRENT, UNSPECIFIED TEAR TYPE, INITIAL ENCOUNTER: Primary | ICD-10-CM

## 2021-12-02 PROCEDURE — 99213 OFFICE O/P EST LOW 20 MIN: CPT | Mod: S$GLB,,, | Performed by: ORTHOPAEDIC SURGERY

## 2021-12-02 PROCEDURE — 99213 PR OFFICE/OUTPT VISIT, EST, LEVL III, 20-29 MIN: ICD-10-PCS | Mod: S$GLB,,, | Performed by: ORTHOPAEDIC SURGERY

## 2022-01-04 ENCOUNTER — HOSPITAL ENCOUNTER (OUTPATIENT)
Dept: RADIOLOGY | Facility: HOSPITAL | Age: 72
Discharge: HOME | End: 2022-01-04
Attending: ORTHOPAEDIC SURGERY
Payer: MEDICARE

## 2022-01-04 DIAGNOSIS — S83.241A TEAR OF MEDIAL MENISCUS OF RIGHT KNEE, CURRENT, UNSPECIFIED TEAR TYPE, INITIAL ENCOUNTER: ICD-10-CM

## 2022-01-04 PROCEDURE — 73721 MRI JNT OF LWR EXTRE W/O DYE: CPT | Mod: TC,RT

## 2022-01-06 ENCOUNTER — OFFICE VISIT (OUTPATIENT)
Dept: ORTHOPEDICS | Facility: CLINIC | Age: 72
End: 2022-01-06
Payer: COMMERCIAL

## 2022-01-06 VITALS — WEIGHT: 184 LBS | BODY MASS INDEX: 25.76 KG/M2 | HEIGHT: 71 IN

## 2022-01-06 DIAGNOSIS — S83.241A TEAR OF MEDIAL MENISCUS OF RIGHT KNEE, CURRENT, UNSPECIFIED TEAR TYPE, INITIAL ENCOUNTER: Primary | ICD-10-CM

## 2022-01-06 PROCEDURE — 99213 PR OFFICE/OUTPT VISIT, EST, LEVL III, 20-29 MIN: ICD-10-PCS | Mod: S$GLB,,, | Performed by: ORTHOPAEDIC SURGERY

## 2022-01-06 PROCEDURE — 99213 OFFICE O/P EST LOW 20 MIN: CPT | Mod: S$GLB,,, | Performed by: ORTHOPAEDIC SURGERY

## 2022-01-06 NOTE — H&P (VIEW-ONLY)
[facility]    Subjective:     Chief Complaint:   Chief Complaint   Patient presents with    Right Knee - Pain     Right knee MRI results review. Knee is still very painful       Past Medical History:   Diagnosis Date    Anticoagulant long-term use     BPH (benign prostatic hyperplasia)     Chronic lower back pain     CKD (chronic kidney disease) stage 3, GFR 30-59 ml/min     Coronary artery disease     Diabetes     Diabetic polyneuropathy associated with type 2 diabetes mellitus 11/12/2019    Dyslipidemia 11/12/2019    Erectile dysfunction     Gastroesophageal reflux disease without esophagitis 11/12/2019    History of CVA (cerebrovascular accident) 11/12/2019    Hypertension     Long-term insulin use 11/12/2019    Pulmonary emphysema     Smokers' cough 11/12/2019       Past Surgical History:   Procedure Laterality Date    CARDIAC CATHETERIZATION  2013    CORONARY ANGIOPLASTY WITH STENT PLACEMENT  05/17/2021    mid circumflex    CORONARY ANGIOPLASTY WITH STENT PLACEMENT Right 5/17/2021    Procedure: Angioplasty, Coronary Artery, With Stent Insertion;  Surgeon: Braydon Potts MD;  [facility] C[facility] LAB;  Service: Cardiology;  Laterality: Right;    LEFT HEART CATHETERIZATION Right 5/17/2021    Procedure: Left heart cath;  Surgeon: Braydon Potts MD;  Location: [facility] CATH LAB;  Service: Cardiology;  Laterality: Right;       Current Outpatient Medications   Medication Sig    albuterol (PROAIR HFA) 90 mcg/actuation inhaler Inhale 1-2 puffs into the lungs every 6 (six) hours as needed for Wheezing. Rescue    aspirin (ECOTRIN) 81 MG EC tablet Take 1 tablet (81 mg total) by mouth once daily.    atorvastatin (LIPITOR) 40 MG tablet Take 1 tablet (40 mg total) by mouth once daily.    blood sugar diagnostic Strp as directed    clopidogreL (PLAVIX) 75 mg tablet Take 75 mg by mouth once daily.    fluconazole (DIFLUCAN) 150 MG Tab Take one tablet today and repeat in 3 days    gabapentin  (NEURONTIN) 100 MG capsule 1-2 capsules in AM and 3 capsules at bedtime for neuropathy pain    ketoconazole (NIZORAL) 2 % cream Apply topically 2 (two) times daily.    lancets 30 gauge Misc as directed    lisinopriL 10 MG tablet Take 1 tablet (10 mg total) by mouth once daily.    metFORMIN (GLUCOPHAGE-XR) 500 MG ER 24hr tablet Take 2 tablets (1,000 mg total) by mouth 2 (two) times daily with meals.    metoprolol tartrate (LOPRESSOR) 50 MG tablet Take 1 tablet (50 mg total) by mouth 2 (two) times daily.    pantoprazole (PROTONIX) 40 MG tablet Take 1 tablet (40 mg total) by mouth once daily.    polyethylene glycol (GLYCOLAX) 17 gram PwPk Take 17 g by mouth once daily.    SITagliptin (JANUVIA) 100 MG Tab Take 1 tablet (100 mg total) by mouth once daily.    tamsulosin (FLOMAX) 0.4 mg Cap Take 1 capsule (0.4 mg total) by mouth once daily. Take in evening after supper    ticagrelor (BRILINTA) 90 mg tablet Take 90 mg by mouth 2 (two) times daily.    ticagrelor (BRILINTA) 90 mg tablet Take 1 tablet (90 mg total) by mouth 2 (two) times daily.    umeclidinium-vilanteroL (ANORO ELLIPTA) 62.5-25 mcg/actuation DsDv Inhale 1 puff into the lungs once daily. Controller     No current facility-administered medications for this visit.       Review of patient's allergies indicates:  No Known Allergies    No family history on file.    Social History     Socioeconomic History    Marital status: Single   Tobacco Use    Smoking status: Current Every Day Smoker     Packs/day: 0.50    Smokeless tobacco: Never Used    Tobacco comment: now smoking 3-5 cigs/day, former use was 2ppd   Substance and Sexual Activity    Alcohol use: Yes    Drug use: Never       History of present illness:  Mr. Nguyen returns to the clinic today with MRI results of his right knee.      Review of Systems:    Constitution: Negative for chills, fever, and sweats.  Negative for unexplained weight loss.    HENT:  Negative for headaches and blurry  "vision.    Cardiovascular:Negative for chest pain or irregular heart beat. Negative for hypertension.    Respiratory:  Negative for cough and shortness of breath.    Gastrointestinal: Negative for abdominal pain, heartburn, melena, nausea, and vomitting.    Genitourinary:  Negative bladder incontinence and dysuria.    Musculoskeletal:  See HPI for details.     Neurological: Negative for numbness.    Psychiatric/Behavioral: Negative for depression.  The patient is not nervous/anxious.      Endocrine: Negative for polyuria    Hematologic/Lymphatic: Negative for bleeding problem.  Does not bruise/bleed easily.    Skin: Negative for poor would healing and rash    Objective:      Physical Examination:  Yoav Morton, Physician Assistant, served in the capacity as a "scribe" for this patient encounter.  A "face-to-face" encounter occurred with Dr. Satinder Stallings on this date.  The treatment plan and medical decision-making is outlined above. Patient was seen and examined with a chaperone.     Vital Signs:  There were no vitals filed for this visit.    Body mass index is 25.66 kg/m².    This a well-developed, well nourished patient in no acute distress.  They are alert and oriented and cooperative to examination.        Right knee exam:  Skin right knee is clean dry and intact.  There is no erythema or ecchymosis.  There are no signs symptoms of infection.  Patient is neurovascular intact throughout his right lower extremity.  Right calf is soft and nontender.  Patient can weightbear as tolerated on his right lower extremity.  Right knee active range of motion 0-110 degrees.  Right knee stable to varus and valgus stresses.  Patient does have exquisite medial joint line tenderness about the right knee.  He does have a positive John's sign on the right with reproduction of pain to the medial aspect of the knee.    Pertinent New Results:    XRAY Report / Interpretation:   No new x-rays were taken today.  However " did review the images of his MRI as well as the MRI report.  He was significant for mild osteoarthritis in the medial and patellofemoral compartments as well as a complex tear of the medial meniscus with displaced meniscal tissue into the medial gutter.    Assessment/Plan:      1.  Right knee medial meniscus tear.    Treatment options were reviewed with the patient today.  Risks and benefits of arthroscopic intervention were explained to him in detail.  All of his questions were answered.  He clearly understood and wished to proceed.  Surgical consents were obtained.    Risks of the procedure were reviewed with the patient.  This includes, but is not limited to: infection, bleeding, pain, swelling, decreased range of motion, nerve injury/damage, deep vein thrombosis, pulmonary embolism, wound dehiscence, and possible need for further surgery.          This note was created using Dragon voice recognition software that occasionally misinterpreted phrases or words.

## 2022-01-06 NOTE — PROGRESS NOTES
[facility]    Subjective:     Chief Complaint:   Chief Complaint   Patient presents with    Right Knee - Pain     Right knee MRI results review. Knee is still very painful       Past Medical History:   Diagnosis Date    Anticoagulant long-term use     BPH (benign prostatic hyperplasia)     Chronic lower back pain     CKD (chronic kidney disease) stage 3, GFR 30-59 ml/min     Coronary artery disease     Diabetes     Diabetic polyneuropathy associated with type 2 diabetes mellitus 11/12/2019    Dyslipidemia 11/12/2019    Erectile dysfunction     Gastroesophageal reflux disease without esophagitis 11/12/2019    History of CVA (cerebrovascular accident) 11/12/2019    Hypertension     Long-term insulin use 11/12/2019    Pulmonary emphysema     Smokers' cough 11/12/2019       Past Surgical History:   Procedure Laterality Date    CARDIAC CATHETERIZATION  2013    CORONARY ANGIOPLASTY WITH STENT PLACEMENT  05/17/2021    mid circumflex    CORONARY ANGIOPLASTY WITH STENT PLACEMENT Right 5/17/2021    Procedure: Angioplasty, Coronary Artery, With Stent Insertion;  Surgeon: Braydon Potts MD;  Location: [facility] CATH LAB;  Service: Cardiology;  Laterality: Right;    LEFT HEART CATHETERIZATION Right 5/17/2021    Procedure: Left heart cath;  Surgeon: Braydon Potts MD;  Location: [facility] CATH LAB;  Service: Cardiology;  Laterality: Right;       Current Outpatient Medications   Medication Sig    albuterol (PROAIR HFA) 90 mcg/actuation inhaler Inhale 1-2 puffs into the lungs every 6 (six) hours as needed for Wheezing. Rescue    aspirin (ECOTRIN) 81 MG EC tablet Take 1 tablet (81 mg total) by mouth once daily.    atorvastatin (LIPITOR) 40 MG tablet Take 1 tablet (40 mg total) by mouth once daily.    blood sugar diagnostic Strp as directed    clopidogreL (PLAVIX) 75 mg tablet Take 75 mg by mouth once daily.    fluconazole (DIFLUCAN) 150 MG Tab Take one tablet today and repeat in 3 days    gabapentin  (NEURONTIN) 100 MG capsule 1-2 capsules in AM and 3 capsules at bedtime for neuropathy pain    ketoconazole (NIZORAL) 2 % cream Apply topically 2 (two) times daily.    lancets 30 gauge Misc as directed    lisinopriL 10 MG tablet Take 1 tablet (10 mg total) by mouth once daily.    metFORMIN (GLUCOPHAGE-XR) 500 MG ER 24hr tablet Take 2 tablets (1,000 mg total) by mouth 2 (two) times daily with meals.    metoprolol tartrate (LOPRESSOR) 50 MG tablet Take 1 tablet (50 mg total) by mouth 2 (two) times daily.    pantoprazole (PROTONIX) 40 MG tablet Take 1 tablet (40 mg total) by mouth once daily.    polyethylene glycol (GLYCOLAX) 17 gram PwPk Take 17 g by mouth once daily.    SITagliptin (JANUVIA) 100 MG Tab Take 1 tablet (100 mg total) by mouth once daily.    tamsulosin (FLOMAX) 0.4 mg Cap Take 1 capsule (0.4 mg total) by mouth once daily. Take in evening after supper    ticagrelor (BRILINTA) 90 mg tablet Take 90 mg by mouth 2 (two) times daily.    ticagrelor (BRILINTA) 90 mg tablet Take 1 tablet (90 mg total) by mouth 2 (two) times daily.    umeclidinium-vilanteroL (ANORO ELLIPTA) 62.5-25 mcg/actuation DsDv Inhale 1 puff into the lungs once daily. Controller     No current facility-administered medications for this visit.       Review of patient's allergies indicates:  No Known Allergies    No family history on file.    Social History     Socioeconomic History    Marital status: Single   Tobacco Use    Smoking status: Current Every Day Smoker     Packs/day: 0.50    Smokeless tobacco: Never Used    Tobacco comment: now smoking 3-5 cigs/day, former use was 2ppd   Substance and Sexual Activity    Alcohol use: Yes    Drug use: Never       History of present illness:  Mr. Nguyen returns to the clinic today with MRI results of his right knee.      Review of Systems:    Constitution: Negative for chills, fever, and sweats.  Negative for unexplained weight loss.    HENT:  Negative for headaches and blurry  "vision.    Cardiovascular:Negative for chest pain or irregular heart beat. Negative for hypertension.    Respiratory:  Negative for cough and shortness of breath.    Gastrointestinal: Negative for abdominal pain, heartburn, melena, nausea, and vomitting.    Genitourinary:  Negative bladder incontinence and dysuria.    Musculoskeletal:  See HPI for details.     Neurological: Negative for numbness.    Psychiatric/Behavioral: Negative for depression.  The patient is not nervous/anxious.      Endocrine: Negative for polyuria    Hematologic/Lymphatic: Negative for bleeding problem.  Does not bruise/bleed easily.    Skin: Negative for poor would healing and rash    Objective:      Physical Examination:  Yoav Morton, Physician Assistant, served in the capacity as a "scribe" for this patient encounter.  A "face-to-face" encounter occurred with Dr. Satinder Stallings on this date.  The treatment plan and medical decision-making is outlined above. Patient was seen and examined with a chaperone.     Vital Signs:  There were no vitals filed for this visit.    Body mass index is 25.66 kg/m².    This a well-developed, well nourished patient in no acute distress.  They are alert and oriented and cooperative to examination.        Right knee exam:  Skin right knee is clean dry and intact.  There is no erythema or ecchymosis.  There are no signs symptoms of infection.  Patient is neurovascular intact throughout his right lower extremity.  Right calf is soft and nontender.  Patient can weightbear as tolerated on his right lower extremity.  Right knee active range of motion 0-110 degrees.  Right knee stable to varus and valgus stresses.  Patient does have exquisite medial joint line tenderness about the right knee.  He does have a positive John's sign on the right with reproduction of pain to the medial aspect of the knee.    Pertinent New Results:    XRAY Report / Interpretation:   No new x-rays were taken today.  However " did review the images of his MRI as well as the MRI report.  He was significant for mild osteoarthritis in the medial and patellofemoral compartments as well as a complex tear of the medial meniscus with displaced meniscal tissue into the medial gutter.    Assessment/Plan:      1.  Right knee medial meniscus tear.    Treatment options were reviewed with the patient today.  Risks and benefits of arthroscopic intervention were explained to him in detail.  All of his questions were answered.  He clearly understood and wished to proceed.  Surgical consents were obtained.    Risks of the procedure were reviewed with the patient.  This includes, but is not limited to: infection, bleeding, pain, swelling, decreased range of motion, nerve injury/damage, deep vein thrombosis, pulmonary embolism, wound dehiscence, and possible need for further surgery.          This note was created using Dragon voice recognition software that occasionally misinterpreted phrases or words.

## 2022-01-12 ENCOUNTER — TELEPHONE (OUTPATIENT)
Dept: FAMILY MEDICINE | Facility: CLINIC | Age: 72
End: 2022-01-12
Payer: COMMERCIAL

## 2022-01-12 ENCOUNTER — TELEPHONE (OUTPATIENT)
Dept: ORTHOPEDICS | Facility: CLINIC | Age: 72
End: 2022-01-12
Payer: COMMERCIAL

## 2022-01-12 DIAGNOSIS — S83.241A TEAR OF MEDIAL MENISCUS OF RIGHT KNEE, CURRENT, UNSPECIFIED TEAR TYPE, INITIAL ENCOUNTER: Primary | ICD-10-CM

## 2022-01-12 DIAGNOSIS — M17.11 PRIMARY OSTEOARTHRITIS OF RIGHT KNEE: ICD-10-CM

## 2022-01-12 DIAGNOSIS — S83.241D TEAR OF MEDIAL MENISCUS OF RIGHT KNEE, CURRENT, SUBSEQUENT ENCOUNTER: ICD-10-CM

## 2022-01-20 ENCOUNTER — TELEPHONE (OUTPATIENT)
Dept: ORTHOPEDICS | Facility: CLINIC | Age: 72
End: 2022-01-20
Payer: COMMERCIAL

## 2022-01-20 NOTE — TELEPHONE ENCOUNTER
----- Message from Susy Schulz sent at 1/20/2022 12:13 PM CST -----  Pt called back while you were at lunch. I informed him you left a message regarding his medication and to please listen to it. Pt phone # [phone]. -KATHLEENB

## 2022-01-20 NOTE — TELEPHONE ENCOUNTER
Received cardiac clearance. Called patient. No answer, went to voicemail. I left a message stating when he should stop/resume his plavix per the cardiologist.

## 2022-01-24 ENCOUNTER — HOSPITAL ENCOUNTER (OUTPATIENT)
Dept: PREADMISSION TESTING | Facility: HOSPITAL | Age: 72
Discharge: HOME | End: 2022-01-24
Attending: ORTHOPAEDIC SURGERY
Payer: COMMERCIAL

## 2022-01-24 ENCOUNTER — HOSPITAL ENCOUNTER (OUTPATIENT)
Dept: RADIOLOGY | Facility: HOSPITAL | Age: 72
Discharge: HOME | End: 2022-01-24
Attending: ORTHOPAEDIC SURGERY
Payer: MEDICARE

## 2022-01-24 VITALS
RESPIRATION RATE: 16 BRPM | SYSTOLIC BLOOD PRESSURE: 161 MMHG | DIASTOLIC BLOOD PRESSURE: 85 MMHG | TEMPERATURE: 98 F | HEART RATE: 68 BPM | HEIGHT: 71 IN | OXYGEN SATURATION: 98 % | BODY MASS INDEX: 27.32 KG/M2 | WEIGHT: 195.13 LBS

## 2022-01-24 DIAGNOSIS — S83.241A TEAR OF MEDIAL MENISCUS OF RIGHT KNEE, CURRENT, UNSPECIFIED TEAR TYPE, INITIAL ENCOUNTER: ICD-10-CM

## 2022-01-24 DIAGNOSIS — M17.11 PRIMARY OSTEOARTHRITIS OF RIGHT KNEE: ICD-10-CM

## 2022-01-24 DIAGNOSIS — Z01.818 PREOP TESTING: Primary | ICD-10-CM

## 2022-01-24 LAB — SARS-COV-2 RDRP RESP QL NAA+PROBE: NEGATIVE

## 2022-01-24 PROCEDURE — 93010 ELECTROCARDIOGRAM REPORT: CPT | Mod: ,,, | Performed by: SPECIALIST

## 2022-01-24 PROCEDURE — 93010 EKG 12-LEAD: ICD-10-PCS | Mod: ,,, | Performed by: SPECIALIST

## 2022-01-24 PROCEDURE — U0002 COVID-19 LAB TEST NON-CDC: HCPCS | Performed by: ORTHOPAEDIC SURGERY

## 2022-01-24 PROCEDURE — 93005 ELECTROCARDIOGRAM TRACING: CPT | Performed by: SPECIALIST

## 2022-01-24 PROCEDURE — 71046 X-RAY EXAM CHEST 2 VIEWS: CPT | Mod: TC

## 2022-01-24 NOTE — DISCHARGE INSTRUCTIONS
You will be called the afternoon prior to surgery between 4:00 - 6:00 PM with a final arrival time.    Please report to [facility] the morning of surgery (parking garage entrance, [address]     MEDICATIONS:  TAKE ONLY THESE MEDICATIONS WITH A SMALL SIP OF WATER THE MORNING OF YOUR PROCEDURE:  See attached      DO NOT TAKE THESE MEDICATIONS 5-7 DAYS PRIOR to procedure or per your surgeon's request: ASPIRIN, ALEVE, ADVIL, IBUPROFEN, FISH OIL, VITAMIN E, HERBALS  (May take Tylenol and/or prescription pain medicine)    DO NOT TAKE BLOOD THINNERS for 7 days prior to procedure, or as instructed by surgeon.  This includes: Aspirin, Coumadin, Plavix, Pradaxa, Xarelto, Aggrenox, Effient, Eliquis, Savasya, Brilinta, etc.      INSTRUCTIONS, IMPORTANT!  · Do not eat or drink anything between midnight and the time of your procedure- this includes gum, mints, and candy.  · Shower the night before AND the morning of your procedure with Hibiclens wash or Dial antibacterial soap, from the neck down.  Do not get it on your face or in your eyes.  You may use your own shampoo and face wash.   · Do not smoke, vape, or drink alcoholic beverages 24 hours before your procedure.  · Wear loose, comfortable clothing.  · Bring a case for removable items - contact lenses, dentures, hearing aids, glasses.  · Leave all jewelry, piercings, and valuables at home.  · Diabetic patients - please check your sugar in the morning before your procedure and do not take any diabetic medicine or insulin.   · DO NOT shave the incision site unless you are given specific instructions to do so.    · If you have sleep apnea please bring your C-PAP machine.  · If your doctor has scheduled you for an overnight stay bring a small overnight bag with any personal items you may need.   · Make arrangements in advance for transportation home by a responsible adult.  TAXIS, UBERS OR LYFTS ARE NOT ALLOWED UPON DISCHARGE.  Please remain with a responsible adult  for 24 hours after anesthesia.       PLEASE NOTE:  The surgery schedule has many variables which may affect the time of your surgery case.  Family members should be available if your surgery time changes.  Plan to be here the day of your procedure between 4-6 hours.    If you have any questions about these instructions, call Pre-Op Admit  Nursing at [phone] or the Pre-Op Day Surgery Unit at [phone].      OTHER:  _______________________________________________________________________________________

## 2022-01-26 ENCOUNTER — HOSPITAL ENCOUNTER (OUTPATIENT)
Facility: HOSPITAL | Age: 72
Discharge: HOME | End: 2022-01-26
Attending: ORTHOPAEDIC SURGERY | Admitting: ORTHOPAEDIC SURGERY
Payer: COMMERCIAL

## 2022-01-26 ENCOUNTER — ANESTHESIA (OUTPATIENT)
Dept: SURGERY | Facility: HOSPITAL | Age: 72
End: 2022-01-26
Payer: COMMERCIAL

## 2022-01-26 ENCOUNTER — ANESTHESIA EVENT (OUTPATIENT)
Dept: SURGERY | Facility: HOSPITAL | Age: 72
End: 2022-01-26
Payer: COMMERCIAL

## 2022-01-26 VITALS
HEIGHT: 71 IN | WEIGHT: 195 LBS | TEMPERATURE: 98 F | OXYGEN SATURATION: 98 % | BODY MASS INDEX: 27.3 KG/M2 | SYSTOLIC BLOOD PRESSURE: 152 MMHG | HEART RATE: 68 BPM | DIASTOLIC BLOOD PRESSURE: 86 MMHG | RESPIRATION RATE: 16 BRPM

## 2022-01-26 DIAGNOSIS — E11.9 TYPE 2 DIABETES MELLITUS WITHOUT COMPLICATION, WITHOUT LONG-TERM CURRENT USE OF INSULIN: Primary | ICD-10-CM

## 2022-01-26 LAB
GLUCOSE SERPL-MCNC: 118 MG/DL (ref 70–110)
GLUCOSE SERPL-MCNC: 127 MG/DL (ref 70–110)

## 2022-01-26 PROCEDURE — 25000003 PHARM REV CODE 250: Performed by: ANESTHESIOLOGY

## 2022-01-26 PROCEDURE — 36000711: Performed by: ORTHOPAEDIC SURGERY

## 2022-01-26 PROCEDURE — 71000015 HC POSTOP RECOV 1ST HR: Performed by: ORTHOPAEDIC SURGERY

## 2022-01-26 PROCEDURE — 63600175 PHARM REV CODE 636 W HCPCS: Performed by: ANESTHESIOLOGY

## 2022-01-26 PROCEDURE — 25000003 PHARM REV CODE 250: Performed by: NURSE ANESTHETIST, CERTIFIED REGISTERED

## 2022-01-26 PROCEDURE — 63600175 PHARM REV CODE 636 W HCPCS: Performed by: ORTHOPAEDIC SURGERY

## 2022-01-26 PROCEDURE — 25000003 PHARM REV CODE 250: Performed by: ORTHOPAEDIC SURGERY

## 2022-01-26 PROCEDURE — 36000710: Performed by: ORTHOPAEDIC SURGERY

## 2022-01-26 PROCEDURE — 71000033 HC RECOVERY, INTIAL HOUR: Performed by: ORTHOPAEDIC SURGERY

## 2022-01-26 PROCEDURE — 37000008 HC ANESTHESIA 1ST 15 MINUTES: Performed by: ORTHOPAEDIC SURGERY

## 2022-01-26 PROCEDURE — 27201423 OPTIME MED/SURG SUP & DEVICES STERILE SUPPLY: Performed by: ORTHOPAEDIC SURGERY

## 2022-01-26 PROCEDURE — 82962 GLUCOSE BLOOD TEST: CPT | Mod: 91 | Performed by: ORTHOPAEDIC SURGERY

## 2022-01-26 PROCEDURE — 63600175 PHARM REV CODE 636 W HCPCS: Performed by: NURSE ANESTHETIST, CERTIFIED REGISTERED

## 2022-01-26 PROCEDURE — 82962 GLUCOSE BLOOD TEST: CPT | Performed by: ORTHOPAEDIC SURGERY

## 2022-01-26 PROCEDURE — 71000039 HC RECOVERY, EACH ADD'L HOUR: Performed by: ORTHOPAEDIC SURGERY

## 2022-01-26 PROCEDURE — 37000009 HC ANESTHESIA EA ADD 15 MINS: Performed by: ORTHOPAEDIC SURGERY

## 2022-01-26 RX ORDER — SODIUM CHLORIDE, SODIUM LACTATE, POTASSIUM CHLORIDE, CALCIUM CHLORIDE 600; 310; 30; 20 MG/100ML; MG/100ML; MG/100ML; MG/100ML
INJECTION, SOLUTION INTRAVENOUS CONTINUOUS PRN
Status: DISCONTINUED | OUTPATIENT
Start: 2022-01-26 | End: 2022-01-26

## 2022-01-26 RX ORDER — FAMOTIDINE 10 MG/ML
INJECTION INTRAVENOUS
Status: DISCONTINUED | OUTPATIENT
Start: 2022-01-26 | End: 2022-01-26

## 2022-01-26 RX ORDER — DIPHENHYDRAMINE HYDROCHLORIDE 50 MG/ML
12.5 INJECTION INTRAMUSCULAR; INTRAVENOUS
Status: DISCONTINUED | OUTPATIENT
Start: 2022-01-26 | End: 2022-01-26 | Stop reason: HOSPADM

## 2022-01-26 RX ORDER — HYDROCODONE BITARTRATE AND ACETAMINOPHEN 5; 325 MG/1; MG/1
1 TABLET ORAL EVERY 4 HOURS PRN
Status: CANCELLED | OUTPATIENT
Start: 2022-01-26

## 2022-01-26 RX ORDER — MUPIROCIN 20 MG/G
1 OINTMENT TOPICAL 2 TIMES DAILY
Status: CANCELLED | OUTPATIENT
Start: 2022-01-26 | End: 2022-01-31

## 2022-01-26 RX ORDER — CEFAZOLIN SODIUM 1 G/3ML
INJECTION, POWDER, FOR SOLUTION INTRAMUSCULAR; INTRAVENOUS
Status: DISCONTINUED | OUTPATIENT
Start: 2022-01-26 | End: 2022-01-26

## 2022-01-26 RX ORDER — ONDANSETRON 4 MG/1
4 TABLET, ORALLY DISINTEGRATING ORAL ONCE
Status: DISCONTINUED | OUTPATIENT
Start: 2022-01-26 | End: 2022-01-26 | Stop reason: HOSPADM

## 2022-01-26 RX ORDER — METHYLPREDNISOLONE ACETATE 80 MG/ML
INJECTION, SUSPENSION INTRA-ARTICULAR; INTRALESIONAL; INTRAMUSCULAR; SOFT TISSUE
Status: DISCONTINUED | OUTPATIENT
Start: 2022-01-26 | End: 2022-01-26 | Stop reason: HOSPADM

## 2022-01-26 RX ORDER — SODIUM CHLORIDE 0.9 % (FLUSH) 0.9 %
10 SYRINGE (ML) INJECTION
Status: DISCONTINUED | OUTPATIENT
Start: 2022-01-26 | End: 2022-01-26 | Stop reason: HOSPADM

## 2022-01-26 RX ORDER — HYDROMORPHONE HYDROCHLORIDE 1 MG/ML
0.2 INJECTION, SOLUTION INTRAMUSCULAR; INTRAVENOUS; SUBCUTANEOUS EVERY 5 MIN PRN
Status: DISCONTINUED | OUTPATIENT
Start: 2022-01-26 | End: 2022-01-26 | Stop reason: HOSPADM

## 2022-01-26 RX ORDER — MEPERIDINE HYDROCHLORIDE 50 MG/ML
12.5 INJECTION INTRAMUSCULAR; INTRAVENOUS; SUBCUTANEOUS EVERY 10 MIN PRN
Status: DISCONTINUED | OUTPATIENT
Start: 2022-01-26 | End: 2022-01-26 | Stop reason: HOSPADM

## 2022-01-26 RX ORDER — ONDANSETRON 2 MG/ML
4 INJECTION INTRAMUSCULAR; INTRAVENOUS DAILY PRN
Status: DISCONTINUED | OUTPATIENT
Start: 2022-01-26 | End: 2022-01-26 | Stop reason: HOSPADM

## 2022-01-26 RX ORDER — LIDOCAINE HYDROCHLORIDE 20 MG/ML
INJECTION, SOLUTION EPIDURAL; INFILTRATION; INTRACAUDAL; PERINEURAL
Status: DISCONTINUED | OUTPATIENT
Start: 2022-01-26 | End: 2022-01-26

## 2022-01-26 RX ORDER — OXYCODONE HYDROCHLORIDE 5 MG/1
5 TABLET ORAL EVERY 4 HOURS PRN
Status: DISCONTINUED | OUTPATIENT
Start: 2022-01-26 | End: 2022-01-26 | Stop reason: HOSPADM

## 2022-01-26 RX ORDER — OXYCODONE HYDROCHLORIDE 5 MG/1
5 TABLET ORAL
Status: DISCONTINUED | OUTPATIENT
Start: 2022-01-26 | End: 2022-01-26 | Stop reason: HOSPADM

## 2022-01-26 RX ORDER — SODIUM CHLORIDE 0.9 G/100ML
IRRIGANT IRRIGATION
Status: DISCONTINUED | OUTPATIENT
Start: 2022-01-26 | End: 2022-01-26 | Stop reason: HOSPADM

## 2022-01-26 RX ORDER — ONDANSETRON 2 MG/ML
INJECTION INTRAMUSCULAR; INTRAVENOUS
Status: DISCONTINUED | OUTPATIENT
Start: 2022-01-26 | End: 2022-01-26

## 2022-01-26 RX ORDER — OXYCODONE AND ACETAMINOPHEN 7.5; 325 MG/1; MG/1
1 TABLET ORAL EVERY 4 HOURS PRN
Qty: 28 TABLET | Refills: 0 | Status: ON HOLD | OUTPATIENT
Start: 2022-01-26 | End: 2022-10-26

## 2022-01-26 RX ORDER — HYDRALAZINE HYDROCHLORIDE 20 MG/ML
5 INJECTION INTRAMUSCULAR; INTRAVENOUS ONCE
Status: COMPLETED | OUTPATIENT
Start: 2022-01-26 | End: 2022-01-26

## 2022-01-26 RX ORDER — BUPIVACAINE HCL/EPINEPHRINE 0.25-.0005
VIAL (ML) INJECTION
Status: DISCONTINUED | OUTPATIENT
Start: 2022-01-26 | End: 2022-01-26 | Stop reason: HOSPADM

## 2022-01-26 RX ORDER — ACETAMINOPHEN 10 MG/ML
INJECTION, SOLUTION INTRAVENOUS
Status: DISCONTINUED | OUTPATIENT
Start: 2022-01-26 | End: 2022-01-26

## 2022-01-26 RX ORDER — DIPHENHYDRAMINE HYDROCHLORIDE 50 MG/ML
INJECTION INTRAMUSCULAR; INTRAVENOUS
Status: DISCONTINUED | OUTPATIENT
Start: 2022-01-26 | End: 2022-01-26

## 2022-01-26 RX ORDER — OXYCODONE HYDROCHLORIDE 5 MG/1
10 TABLET ORAL ONCE
Status: DISCONTINUED | OUTPATIENT
Start: 2022-01-26 | End: 2022-01-26 | Stop reason: HOSPADM

## 2022-01-26 RX ORDER — PROPOFOL 10 MG/ML
VIAL (ML) INTRAVENOUS
Status: DISCONTINUED | OUTPATIENT
Start: 2022-01-26 | End: 2022-01-26

## 2022-01-26 RX ORDER — FENTANYL CITRATE 50 UG/ML
INJECTION, SOLUTION INTRAMUSCULAR; INTRAVENOUS
Status: DISCONTINUED | OUTPATIENT
Start: 2022-01-26 | End: 2022-01-26

## 2022-01-26 RX ADMIN — HYDRALAZINE HYDROCHLORIDE 5 MG: 20 INJECTION, SOLUTION INTRAMUSCULAR; INTRAVENOUS at 10:01

## 2022-01-26 RX ADMIN — HYDROMORPHONE HYDROCHLORIDE 0.2 MG: 1 INJECTION, SOLUTION INTRAMUSCULAR; INTRAVENOUS; SUBCUTANEOUS at 10:01

## 2022-01-26 RX ADMIN — CEFAZOLIN 2 G: 330 INJECTION, POWDER, FOR SOLUTION INTRAMUSCULAR; INTRAVENOUS at 08:01

## 2022-01-26 RX ADMIN — DIPHENHYDRAMINE HYDROCHLORIDE 6.25 MG: 50 INJECTION, SOLUTION INTRAMUSCULAR; INTRAVENOUS at 08:01

## 2022-01-26 RX ADMIN — PROPOFOL 120 MG: 10 INJECTION, EMULSION INTRAVENOUS at 08:01

## 2022-01-26 RX ADMIN — ONDANSETRON 4 MG: 2 INJECTION INTRAMUSCULAR; INTRAVENOUS at 08:01

## 2022-01-26 RX ADMIN — LIDOCAINE HYDROCHLORIDE 50 MG: 20 INJECTION, SOLUTION INTRAVENOUS at 08:01

## 2022-01-26 RX ADMIN — FENTANYL CITRATE 50 MCG: 50 INJECTION INTRAMUSCULAR; INTRAVENOUS at 08:01

## 2022-01-26 RX ADMIN — ACETAMINOPHEN 1000 MG: 10 INJECTION, SOLUTION INTRAVENOUS at 08:01

## 2022-01-26 RX ADMIN — FAMOTIDINE 20 MG: 10 INJECTION, SOLUTION INTRAVENOUS at 08:01

## 2022-01-26 RX ADMIN — MEPERIDINE HYDROCHLORIDE 12.5 MG: 50 INJECTION INTRAMUSCULAR; INTRAVENOUS; SUBCUTANEOUS at 09:01

## 2022-01-26 RX ADMIN — OXYCODONE HYDROCHLORIDE 5 MG: 5 TABLET ORAL at 10:01

## 2022-01-26 RX ADMIN — SODIUM CHLORIDE, SODIUM LACTATE, POTASSIUM CHLORIDE, AND CALCIUM CHLORIDE: .6; .31; .03; .02 INJECTION, SOLUTION INTRAVENOUS at 08:01

## 2022-01-26 NOTE — OP NOTE
[facility]  Surgery Department  Operative Note    SUMMARY     Date of Procedure: 1/26/2022     Procedure:  Partial medial and lateral meniscectomies right knee    Surgeon(s) and Role:     * Satinder Stallings MD - Primary    Assisting Surgeon:  Sim    Pre-Operative Diagnosis: Tear of medial meniscus of right knee, current, unspecified tear type, initial encounter [S83.241A]  Primary osteoarthritis of right knee [M17.11]    Post-Operative Diagnosis: Post-Op Diagnosis Codes:     * Tear of medial meniscus of right knee, current, unspecified tear type, initial encounter [S83.241A]     * Primary osteoarthritis of right knee [M17.11]  Lateral meniscal tear degenerative  Anesthesia: General    Intraoperative Findings:  The patellofemoral joint was noted to have grade 3 chondromalacia throughout the trochlea.  The ACL and PCL were intact.  The lateral compartment demonstrated global grade 2 chondromalacia.  There was a shredded posterior horn lateral meniscal tear.  The medial compartment demonstrated grade 3 in early grade 4 chondromalacia with extensive tearing of the posterior horn of the medial meniscus.    Description of the Findings of the Procedure:  Patient was placed on the operating table supine position.  The knee was prepped and draped in the usual sterile manner for surgery.  Inferomedial and inferolateral portals were established and diagnostic arthroscopy was undertaken the findings of those stated above.  At this point I turned my attention to the lateral meniscus.  There series of Shaila and vitals were utilized and 60 percent of the lateral meniscus was excised and balanced into the body.  I now turned my attention to the medial meniscus.  Again there series of Shaila and vitals were utilized an approximately 80 percent of the meniscus was excised and balanced into the anterior horn.  I now used a shaver and debrided any fragments that had been created during arthroscopy.  I removed the  arthroscopic equipment in the portals were closed with nylon stitches.  Sterile dressings were applied and the patient was noted to be in stable condition    Complications: No    Estimated Blood Loss (EBL): * No values recorded between 1/26/2022  9:16 AM and 1/26/2022  9:37 AM *           Implants: * No implants in log *    Specimens:   Specimen (24h ago, onward)            None                  Condition: Good    Disposition: PACU - hemodynamically stable.                   Discharge Note    SUMMARY     Admit Date: 1/26/2022    Discharge Date and Time:  01/26/2022 9:37 AM    Hospital Course (synopsis of major diagnoses, care, treatment, and services provided during the course of the hospital stay): Uneventful      Final Diagnosis: Post-Op Diagnosis Codes:     * Tear of medial meniscus of right knee, current, unspecified tear type, initial encounter [S83.241A]     * Primary osteoarthritis of right knee [M17.11]    Disposition: Home or Self Care    Follow Up/Patient Instructions:     Medications:  Reconciled Home Medications:   Current Discharge Medication List      START taking these medications    Details   oxyCODONE-acetaminophen (PERCOCET) 7.5-325 mg per tablet Take 1 tablet by mouth every 4 (four) hours as needed for Pain.  Qty: 28 tablet, Refills: 0    Comments: Quantity prescribed more than 7 day supply? Yes, quantity medically necessary         CONTINUE these medications which have NOT CHANGED    Details   albuterol (PROAIR HFA) 90 mcg/actuation inhaler Inhale 1-2 puffs into the lungs every 6 (six) hours as needed for Wheezing. Rescue  Qty: 18 g, Refills: 2    Associated Diagnoses: Smokers' cough      aspirin (ECOTRIN) 81 MG EC tablet Take 1 tablet (81 mg total) by mouth once daily.    Associated Diagnoses: History of CVA (cerebrovascular accident)      atorvastatin (LIPITOR) 40 MG tablet Take 1 tablet (40 mg total) by mouth once daily.  Qty: 90 tablet, Refills: 1    Associated Diagnoses: Dyslipidemia       gabapentin (NEURONTIN) 100 MG capsule 1-2 capsules in AM and 3 capsules at bedtime for neuropathy pain  Qty: 360 capsule, Refills: 1    Associated Diagnoses: Diabetic polyneuropathy associated with type 2 diabetes mellitus      lisinopriL 10 MG tablet Take 1 tablet (10 mg total) by mouth once daily.  Qty: 90 tablet, Refills: 1    Comments: .  Associated Diagnoses: Essential hypertension      metFORMIN (GLUCOPHAGE-XR) 500 MG ER 24hr tablet Take 2 tablets (1,000 mg total) by mouth 2 (two) times daily with meals.  Qty: 360 tablet, Refills: 1    Associated Diagnoses: Diabetic polyneuropathy associated with type 2 diabetes mellitus      metoprolol tartrate (LOPRESSOR) 50 MG tablet Take 1 tablet (50 mg total) by mouth 2 (two) times daily.  Qty: 180 tablet, Refills: 1    Comments: .  Associated Diagnoses: Essential hypertension      pantoprazole (PROTONIX) 40 MG tablet Take 1 tablet (40 mg total) by mouth once daily.  Qty: 90 tablet, Refills: 1    Associated Diagnoses: Gastroesophageal reflux disease, unspecified whether esophagitis present      SITagliptin (JANUVIA) 100 MG Tab Take 1 tablet (100 mg total) by mouth once daily.  Qty: 90 tablet, Refills: 1    Associated Diagnoses: Diabetic polyneuropathy associated with type 2 diabetes mellitus      tamsulosin (FLOMAX) 0.4 mg Cap Take 1 capsule (0.4 mg total) by mouth once daily. Take in evening after supper  Qty: 90 capsule, Refills: 1    Associated Diagnoses: Benign prostatic hyperplasia with urinary hesitancy      ticagrelor (BRILINTA) 90 mg tablet Take 1 tablet (90 mg total) by mouth 2 (two) times daily.  Qty: 60 tablet, Refills: 11      umeclidinium-vilanteroL (ANORO ELLIPTA) 62.5-25 mcg/actuation DsDv Inhale 1 puff into the lungs once daily. Controller  Qty: 180 each, Refills: 1    Associated Diagnoses: Pulmonary emphysema, unspecified emphysema type      blood sugar diagnostic Strp as directed      clopidogreL (PLAVIX) 75 mg tablet Take 75 mg by mouth once daily.       ketoconazole (NIZORAL) 2 % cream Apply topically 2 (two) times daily.  Qty: 30 g, Refills: 1    Associated Diagnoses: Balanitis      lancets 30 gauge Misc as directed           Discharge Procedure Orders   Diet general     Activity as tolerated     Sponge bath only until clinic visit     Ice to affected area     Weight bearing restrictions (specify)     Call MD for:  temperature >100.4     Call MD for:  persistent nausea and vomiting     Call MD for:  severe uncontrolled pain     Call MD for:  difficulty breathing, headache or visual disturbances     Call MD for:  redness, tenderness, or signs of infection (pain, swelling, redness, odor or green/yellow discharge around incision site)     Call MD for:  hives     Call MD for:  persistent dizziness or light-headedness     Call MD for:  extreme fatigue     Remove dressing in 24 hours     Shower on day dressing removed (No bath)

## 2022-01-26 NOTE — DISCHARGE INSTRUCTIONS
No driving, operating heavy machinery or signing legal documents x 24 hours  No drinking alcohol x 24 hours or while taking pain medication  You should not be driving while taking pain medication  May remove dressing in 48 hours and shower. Leave wound open to air after.  Do Not submerge wound in a tub, any pools of water or swimming pools until wound is completely healed  Keep an eye on wound- edges should be pink and well approximated-  the wound should not be red and inflamed.  Wound edges should not be .   Your wound should not be draining anything green, yellow or foul smelling- for these call the MD  You should not be running a temp greater than 101     keep follow up appt  Bring pictures to follow up appt  Weight bearing as tolerated  Ice x 24 hours  20 min on 20 min off

## 2022-01-26 NOTE — ANESTHESIA POSTPROCEDURE EVALUATION
Anesthesia Post Evaluation    Patient: Vijay Patrick    Procedure(s) Performed: Procedure(s) (LRB):  ARTHROSCOPY, KNEE, WITH  MENISCECTOMY (Right)    Final Anesthesia Type: general      Patient location during evaluation: PACU  Patient participation: Yes- Able to Participate  Level of consciousness: awake and alert and oriented  Post-procedure vital signs: reviewed and stable  Pain management: adequate  Airway patency: patent    PONV status at discharge: No PONV  Anesthetic complications: no      Cardiovascular status: blood pressure returned to baseline, hemodynamically stable and stable  Respiratory status: unassisted, spontaneous ventilation and room air  Hydration status: euvolemic  Follow-up not needed.          Vitals Value Taken Time   /77 01/26/22 1045   Temp 36.2 °C (97.1 °F) 01/26/22 1015   Pulse 62 01/26/22 1052   Resp 14 01/26/22 1052   SpO2 100 % 01/26/22 1052   Vitals shown include unvalidated device data.      No case tracking events are documented in the log.      Pain/Lennox Score: Pain Rating Prior to Med Admin: 6 (1/26/2022 10:25 AM)  Lennox Score: 10 (1/26/2022 10:45 AM)

## 2022-01-26 NOTE — ANESTHESIA PREPROCEDURE EVALUATION
01/26/2022  Vijay Nguyen is a 71 y.o., male.    Patient Active Problem List   Diagnosis    History of CVA (cerebrovascular accident)    Diabetic polyneuropathy associated with type 2 diabetes mellitus    Dyslipidemia    Smokers' cough    Gastroesophageal reflux disease without esophagitis    Type 2 diabetes mellitus without complication, without long-term current use of insulin    NSTEMI (non-ST elevated myocardial infarction)    Tobacco abuse    Coronary artery disease involving native coronary artery    S/P drug eluting coronary stent placement; LCX 5-17-21 for NSTEMI       Past Surgical History:   Procedure Laterality Date    CORONARY ANGIOPLASTY WITH STENT PLACEMENT Right 5/17/2021    Procedure: Angioplasty, Coronary Artery, With Stent Insertion;  Surgeon: Braydon Potts MD;  Location: [facility] CATH LAB;  Service: Cardiology;  Laterality: Right;    LEFT HEART CATHETERIZATION Right 5/17/2021    Procedure: Left heart cath;  Surgeon: Braydon Potts MD;  Location: [facility] CATH LAB;  Service: Cardiology;  Laterality: Right;    REPAIR OF LACERATION Right     arm        Tobacco Use:  The patient  reports that he has been smoking. He has been smoking about 1.00 pack per day. He has never used smokeless tobacco.     Results for orders placed or performed during the hospital encounter of 01/24/22   EKG 12-lead    Collection Time: 01/24/22 10:40 AM    Narrative    Test Reason : S83.241A,M17.11,    Vent. Rate : 067 BPM     Atrial Rate : 067 BPM     P-R Int : 176 ms          QRS Dur : 084 ms      QT Int : 390 ms       P-R-T Axes : 041 029 066 degrees     QTc Int : 412 ms    Normal sinus rhythm  Moderate voltage criteria for LVH, may be normal variant  Borderline Abnormal ECG  When compared with ECG of 14-MAY-2021 12:58,  Criteria for Septal infarct are no longer Present    Referred By:  FINGER            Confirmed By:              Lab Results   Component Value Date    WBC 6.32 01/24/2022    HGB 12.1 (L) 01/24/2022    HCT 38.6 (L) 01/24/2022    MCV 92 01/24/2022     01/24/2022     BMP  Lab Results   Component Value Date     01/24/2022    K 4.4 01/24/2022     01/24/2022    CO2 25 01/24/2022    BUN 11 01/24/2022    CREATININE 0.8 01/24/2022    CALCIUM 8.9 01/24/2022    ANIONGAP 10 01/24/2022     (H) 01/24/2022     (H) 05/18/2021     (H) 05/17/2021       No results found for this or any previous visit.        Anesthesia Evaluation    I have reviewed the Patient Summary Reports.    I have reviewed the Nursing Notes. I have reviewed the NPO Status.      Review of Systems  Anesthesia Hx:  No problems with previous Anesthesia  Denies Family Hx of Anesthesia complications.   Denies Personal Hx of Anesthesia complications.   Social:  Smoker, No Alcohol Use    Hematology/Oncology:         -- Anemia: Hematology Comments: Plavix therapy   EENT/Dental:   Eyes: Visual Impairment Has Bilateral and S/P Extraction - Left Catarract   Cardiovascular:   Past MI CAD asymptomatic CABG/stent  ECG has been reviewed. Patient clear for surgery Dr. Potts 01/19/2022   Pulmonary:   COPD, mild Pulmonary emphysema  Inhaler use as needed last used 2 days ago  No admissions for exacerbation  No home oxygen use  No pulmonologist   Renal/:   Chronic Renal Disease, CRI BPH Chronic kidney disease stage 3   Hepatic/GI:   GERD, well controlled    Musculoskeletal:   Arthritis   Spine Disorders: lumbar Chronic Pain    Neurological:   CVA, no residual symptoms Neuromuscular Disease, Diabetic polyneuropathy  Peripheral Neuropathy    Endocrine:   Diabetes, poorly controlled, type 2    Dermatological:  Skin Normal    Psych:  Psychiatric Normal           Physical Exam  General:  Well nourished    Airway/Jaw/Neck:  Airway Findings: Mouth Opening: Normal Tongue: Normal  General Airway Assessment: Adult  Mallampati: III  TM  Distance: Normal, at least 6 cm  Jaw/Neck Findings:  Neck ROM: Normal ROM      Dental:  Dental Findings:   Chest/Lungs:  Chest/Lungs Findings: Clear to auscultation, Normal Respiratory Rate     Heart/Vascular:  Heart Findings: Rate: Normal  Rhythm: Regular Rhythm  Sounds: Normal        Mental Status:  Mental Status Findings:  Cooperative, Alert and Oriented         Anesthesia Plan  Type of Anesthesia, risks & benefits discussed:  Anesthesia Type:  general    Patient's Preference:   Plan Factors:          Intra-op Monitoring Plan: standard ASA monitors  Intra-op Monitoring Plan Comments:   Post Op Pain Control Plan: multimodal analgesia, IV/PO Opioids PRN and per primary service following discharge from PACU  Post Op Pain Control Plan Comments:     Induction:   IV  Beta Blocker:  Patient is on a Beta-Blocker and has received one dose within the past 24 hours (No further documentation required).       Informed Consent: Patient understands risks and agrees with Anesthesia plan.  Questions answered. Anesthesia consent signed with patient.  ASA Score: 3     Day of Surgery Review of History & Physical:        Anesthesia Plan Notes:       General LMA okay  Benadryl 6.25mg iv  Zofran 4mg iv  Pepcid 20mg iv   Ofirmev 1000 mg iv  Sugammadex if needed        Ready For Surgery From Anesthesia Perspective.

## 2022-01-26 NOTE — TRANSFER OF CARE
"Anesthesia Transfer of Care Note    Patient: Vijay Patrick    Procedure(s) Performed: Procedure(s) (LRB):  ARTHROSCOPY, KNEE, WITH medial AND LATERAL MENISCECTOMY, RIGHT (Right)    Patient location: PACU    Anesthesia Type: general    Transport from OR: Transported from OR on room air with adequate spontaneous ventilation    Post pain: adequate analgesia    Post assessment: no apparent anesthetic complications    Post vital signs: stable    Level of consciousness: awake    Nausea/Vomiting: no nausea/vomiting    Complications: none    Transfer of care protocol was followed      Last vitals:   Visit Vitals  BP (!) 167/79 (BP Location: Left arm)   Pulse 61   Temp 36.6 °C (97.9 °F) (Oral)   Resp 16   Ht 5' 11" (1.803 m)   Wt 88.5 kg (195 lb)   SpO2 100%   BMI 27.20 kg/m²     "

## 2022-02-10 ENCOUNTER — OFFICE VISIT (OUTPATIENT)
Dept: ORTHOPEDICS | Facility: CLINIC | Age: 72
End: 2022-02-10
Payer: COMMERCIAL

## 2022-02-10 VITALS — WEIGHT: 195 LBS | HEIGHT: 71 IN | BODY MASS INDEX: 27.3 KG/M2

## 2022-02-10 DIAGNOSIS — Z98.890 S/P RIGHT KNEE ARTHROSCOPY: Primary | ICD-10-CM

## 2022-02-10 PROCEDURE — 99024 POSTOP FOLLOW-UP VISIT: CPT | Mod: S$GLB,,, | Performed by: ORTHOPAEDIC SURGERY

## 2022-02-10 PROCEDURE — 99024 PR POST-OP FOLLOW-UP VISIT: ICD-10-PCS | Mod: S$GLB,,, | Performed by: ORTHOPAEDIC SURGERY

## 2022-02-10 NOTE — PROGRESS NOTES
[facility] POST-OP NOTE    Subjective:           Chief Complaint:   Chief Complaint   Patient presents with    Right Knee - Pain     Right knee scope 01/26/2022, patient is doing well, no pain.       Past Medical History:   Diagnosis Date    Anticoagulant long-term use     Arthritis     BPH (benign prostatic hyperplasia)     Chronic lower back pain     CKD (chronic kidney disease) stage 3, GFR 30-59 ml/min     Coronary artery disease     Diabetes     Diabetic polyneuropathy associated with type 2 diabetes mellitus 11/12/2019    Dyslipidemia 11/12/2019    Encounter for blood transfusion     Erectile dysfunction     Gastroesophageal reflux disease without esophagitis 11/12/2019    History of CVA (cerebrovascular accident) 11/12/2019    Hypertension     Long-term insulin use 11/12/2019    Medial meniscus tear 01/2022    right    MI (myocardial infarction)     Pulmonary emphysema     Right foot pain 01/2022    old injury to foot    Smokers' cough 11/12/2019       Past Surgical History:   Procedure Laterality Date    CORONARY ANGIOPLASTY WITH STENT PLACEMENT Right 5/17/2021    Procedure: Angioplasty, Coronary Artery, With Stent Insertion;  Surgeon: Braydon Potts MD;  Location: [facility] CATH LAB;  Service: Cardiology;  Laterality: Right;    KNEE ARTHROSCOPY W/ MENISCECTOMY Right 1/26/2022    Procedure: ARTHROSCOPY, KNEE, WITH  MENISCECTOMY;  Surgeon: Satinder Stallings MD;  Location: [facility] OR;  Service: Orthopedics;  Laterality: Right;    LEFT HEART CATHETERIZATION Right 5/17/2021    Procedure: Left heart cath;  Surgeon: Braydon Potts MD;  Location: [facility] CATH LAB;  Service: Cardiology;  Laterality: Right;    REPAIR OF LACERATION Right     arm       Current Outpatient Medications   Medication Sig    albuterol (PROAIR HFA) 90 mcg/actuation inhaler Inhale 1-2 puffs into the lungs every 6 (six) hours as needed for Wheezing. Rescue    aspirin (ECOTRIN) 81 MG EC tablet Take 1 tablet (81 mg total)  by mouth once daily.    atorvastatin (LIPITOR) 40 MG tablet Take 1 tablet (40 mg total) by mouth once daily.    blood sugar diagnostic Strp as directed    clopidogreL (PLAVIX) 75 mg tablet Take 75 mg by mouth once daily.    gabapentin (NEURONTIN) 100 MG capsule 1-2 capsules in AM and 3 capsules at bedtime for neuropathy pain    ketoconazole (NIZORAL) 2 % cream Apply topically 2 (two) times daily.    lancets 30 gauge Misc as directed    lisinopriL 10 MG tablet Take 1 tablet (10 mg total) by mouth once daily.    metFORMIN (GLUCOPHAGE-XR) 500 MG ER 24hr tablet Take 2 tablets (1,000 mg total) by mouth 2 (two) times daily with meals.    metoprolol tartrate (LOPRESSOR) 50 MG tablet Take 1 tablet (50 mg total) by mouth 2 (two) times daily.    oxyCODONE-acetaminophen (PERCOCET) 7.5-325 mg per tablet Take 1 tablet by mouth every 4 (four) hours as needed for Pain.    pantoprazole (PROTONIX) 40 MG tablet Take 1 tablet (40 mg total) by mouth once daily.    SITagliptin (JANUVIA) 100 MG Tab Take 1 tablet (100 mg total) by mouth once daily.    tamsulosin (FLOMAX) 0.4 mg Cap Take 1 capsule (0.4 mg total) by mouth once daily. Take in evening after supper    ticagrelor (BRILINTA) 90 mg tablet Take 1 tablet (90 mg total) by mouth 2 (two) times daily.    umeclidinium-vilanteroL (ANORO ELLIPTA) 62.5-25 mcg/actuation DsDv Inhale 1 puff into the lungs once daily. Controller     No current facility-administered medications for this visit.       Review of patient's allergies indicates:  No Known Allergies    No family history on file.    Social History     Socioeconomic History    Marital status: Single   Tobacco Use    Smoking status: Current Every Day Smoker     Packs/day: 1.00    Smokeless tobacco: Never Used    Tobacco comment: do not smoke DOS   Substance and Sexual Activity    Alcohol use: Not Currently    Drug use: Never       History of present illness:  Patient comes in today for the knee.  He is doing very  well.  His pain is minimal.  His calf is soft.  He denies any shortness of breath.  Denies any calf pain      Review of Systems:    Musculoskeletal:  See HPI      Objective:        Physical Examination:    Vital Signs:  There were no vitals filed for this visit.    Body mass index is 27.2 kg/m².    This a well-developed, well nourished patient in no acute distress.  They are alert and oriented and cooperative to examination.        Wounds are clean dry and intact.  He is neurovascular intact sensory testing.  Homans sign is negative  Pertinent New Results:    XRAY Report / Interpretation:   No new XRAYS Today.    Assessment/Plan:      Stable following arthroscopy of the right knee.  I removed his sutures today.  Start him on a strengthening program.  Follow-up in 1 month    This note was created using Dragon voice recognition software that occasionally misinterpreted phrases or words.

## 2022-03-08 ENCOUNTER — TELEPHONE (OUTPATIENT)
Dept: FAMILY MEDICINE | Facility: CLINIC | Age: 72
End: 2022-03-08
Payer: COMMERCIAL

## 2022-03-08 NOTE — TELEPHONE ENCOUNTER
Pt no showed his last appt. Please call to reschedule pt and let him know that he is at risk of being d/c.

## 2022-03-24 ENCOUNTER — OFFICE VISIT (OUTPATIENT)
Dept: ORTHOPEDICS | Facility: CLINIC | Age: 72
End: 2022-03-24
Payer: COMMERCIAL

## 2022-03-24 VITALS — WEIGHT: 195 LBS | BODY MASS INDEX: 27.3 KG/M2 | HEIGHT: 71 IN

## 2022-03-24 DIAGNOSIS — M17.12 PRIMARY OSTEOARTHRITIS OF LEFT KNEE: Primary | ICD-10-CM

## 2022-03-24 DIAGNOSIS — Z98.890 S/P RIGHT KNEE ARTHROSCOPY: ICD-10-CM

## 2022-03-24 PROCEDURE — 99213 PR OFFICE/OUTPT VISIT, EST, LEVL III, 20-29 MIN: ICD-10-PCS | Mod: 24,25,S$GLB, | Performed by: ORTHOPAEDIC SURGERY

## 2022-03-24 PROCEDURE — 99024 POSTOP FOLLOW-UP VISIT: CPT | Mod: S$GLB,,, | Performed by: ORTHOPAEDIC SURGERY

## 2022-03-24 PROCEDURE — 99024 PR POST-OP FOLLOW-UP VISIT: ICD-10-PCS | Mod: S$GLB,,, | Performed by: ORTHOPAEDIC SURGERY

## 2022-03-24 PROCEDURE — 20610 DRAIN/INJ JOINT/BURSA W/O US: CPT | Mod: 79,LT,S$GLB, | Performed by: ORTHOPAEDIC SURGERY

## 2022-03-24 PROCEDURE — 99213 OFFICE O/P EST LOW 20 MIN: CPT | Mod: 24,25,S$GLB, | Performed by: ORTHOPAEDIC SURGERY

## 2022-03-24 PROCEDURE — 20610 LARGE JOINT ASPIRATION/INJECTION: L KNEE: ICD-10-PCS | Mod: 79,LT,S$GLB, | Performed by: ORTHOPAEDIC SURGERY

## 2022-03-24 RX ORDER — TRIAMCINOLONE ACETONIDE 40 MG/ML
40 INJECTION, SUSPENSION INTRA-ARTICULAR; INTRAMUSCULAR
Status: DISCONTINUED | OUTPATIENT
Start: 2022-03-24 | End: 2022-03-24 | Stop reason: HOSPADM

## 2022-03-24 RX ADMIN — TRIAMCINOLONE ACETONIDE 40 MG: 40 INJECTION, SUSPENSION INTRA-ARTICULAR; INTRAMUSCULAR at 01:03

## 2022-03-24 NOTE — PROCEDURES
Large Joint Aspiration/Injection: L knee    Date/Time: 3/24/2022 1:30 PM  Performed by: Satinder Stallings MD  Authorized by: Satinder Stallings MD     Consent Done?:  Yes (Verbal)  Indications:  Pain  Site marked: the procedure site was marked    Timeout: prior to procedure the correct patient, procedure, and site was verified    Prep: patient was prepped and draped in usual sterile fashion      Local anesthesia used?: Yes    Local anesthetic:  Lidocaine 1% without epinephrine    Details:  Needle Size:  25 G  Ultrasonic Guidance for needle placement?: No    Location:  Knee  Site:  L knee  Medications:  40 mg triamcinolone acetonide 40 mg/mL  Patient tolerance:  Patient tolerated the procedure well with no immediate complications
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
## 2022-05-12 ENCOUNTER — TELEPHONE (OUTPATIENT)
Dept: FAMILY MEDICINE | Facility: CLINIC | Age: 72
End: 2022-05-12

## 2022-05-12 NOTE — TELEPHONE ENCOUNTER
Discharge letter mailed to pt. Pt has no showed 4 appointments. Given to gabe to send as certified letter.

## 2022-05-20 ENCOUNTER — TELEPHONE (OUTPATIENT)
Dept: FAMILY MEDICINE | Facility: CLINIC | Age: 72
End: 2022-05-20

## 2022-05-20 NOTE — TELEPHONE ENCOUNTER
Certified letter receipt received today but not signed. Fernando Pantoja send another certified letter with this receipt attached. Did this and scanned in receipt received today with new letter that we sent.

## 2022-05-23 ENCOUNTER — TELEPHONE (OUTPATIENT)
Dept: FAMILY MEDICINE | Facility: CLINIC | Age: 72
End: 2022-05-23

## 2022-05-23 NOTE — TELEPHONE ENCOUNTER
Pt no showed last appointment. Please call to reschedule. Please remind pt that they are at risk for being discharged.

## 2022-05-28 ENCOUNTER — HOSPITAL ENCOUNTER (EMERGENCY)
Facility: HOSPITAL | Age: 72
Discharge: HOME | End: 2022-05-29
Attending: EMERGENCY MEDICINE
Payer: COMMERCIAL

## 2022-05-28 DIAGNOSIS — S16.1XXA ACUTE STRAIN OF NECK MUSCLE, INITIAL ENCOUNTER: ICD-10-CM

## 2022-05-28 DIAGNOSIS — T14.90XA TRAUMA: ICD-10-CM

## 2022-05-28 DIAGNOSIS — S90.31XA CONTUSION OF RIGHT FOOT, INITIAL ENCOUNTER: ICD-10-CM

## 2022-05-28 DIAGNOSIS — S80.11XA CONTUSION OF RIGHT TIBIA: ICD-10-CM
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
## 2022-05-28 PROCEDURE — 99284 EMERGENCY DEPT VISIT MOD MDM: CPT

## 2022-05-29 VITALS
OXYGEN SATURATION: 96 % | SYSTOLIC BLOOD PRESSURE: 120 MMHG | TEMPERATURE: 98 F | HEART RATE: 70 BPM | HEIGHT: 71 IN | DIASTOLIC BLOOD PRESSURE: 60 MMHG | WEIGHT: 205 LBS | BODY MASS INDEX: 28.7 KG/M2 | RESPIRATION RATE: 18 BRPM

## 2022-05-29 PROCEDURE — 25000003 PHARM REV CODE 250: Performed by: EMERGENCY MEDICINE

## 2022-05-29 RX ORDER — METHOCARBAMOL 500 MG/1
1000 TABLET, FILM COATED ORAL 3 TIMES DAILY
Qty: 30 TABLET | Refills: 0 | Status: SHIPPED | OUTPATIENT
Start: 2022-05-29 | End: 2022-06-03

## 2022-05-29 RX ORDER — HYDROCODONE BITARTRATE AND ACETAMINOPHEN 5; 325 MG/1; MG/1
1 TABLET ORAL
Status: COMPLETED | OUTPATIENT
Start: 2022-05-29 | End: 2022-05-29

## 2022-05-29 RX ADMIN — HYDROCODONE BITARTRATE AND ACETAMINOPHEN 1 TABLET: 5; 325 TABLET ORAL at 02:05
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
## 2022-05-29 NOTE — ED NOTES
"Ambulated pt.  Pt required pot op shoe for ambulation.  Pt denied the need for crutches stating that "he didn't want them"  "

## 2022-08-25 ENCOUNTER — TELEPHONE (OUTPATIENT)
Dept: SURGERY | Facility: CLINIC | Age: 72
End: 2022-08-25
Payer: COMMERCIAL

## 2022-08-25 DIAGNOSIS — Z12.11 SCREENING FOR COLON CANCER: Primary | ICD-10-CM

## 2022-08-25 RX ORDER — SODIUM, POTASSIUM,MAG SULFATES 17.5-3.13G
1 SOLUTION, RECONSTITUTED, ORAL ORAL DAILY
Qty: 1 KIT | Refills: 0 | Status: SHIPPED | OUTPATIENT
Start: 2022-08-25 | End: 2022-08-27

## 2022-08-25 RX ORDER — SODIUM CHLORIDE 0.9 % (FLUSH) 0.9 %
3 SYRINGE (ML) INJECTION
Status: CANCELLED | OUTPATIENT
Start: 2022-08-25

## 2022-08-25 NOTE — TELEPHONE ENCOUNTER
All of the following information was explained,and discussed with this patient's son (James Nguyen) on behalf of the patient,with the patient's verbal permissioCalled patient in reference to a referral to Colorectal Surgery for colon cancer screening. Patient verbally consented to a Colonoscopy and requested to be scheduled for a Colonoscopy on 10/26/2022 - Pending Cardiac Clearance Patient was advised a designated  is required on the day of the Colonoscopy to drive the patient home and the  must be at least. 18 years old.Colonoscopy Prep instructions were thoroughly explained and discussed with the patient.   It was emphasized, and reiterated to the patient, not to follow the prep instructions that comes with the Prep Kit. However, to please follow the prep instructions that will be received in the mail from the [facility]   Patient acknowledges understanding Prep instructions as explained and discussed on the phone.. Patient was advised the Colonoscopy Prep instructions discussed and explained on the phone,are being mailed out to the patient's verified address on file. Patient's address on file was verified with the patient for accuracy of mailing. Patient's medications on file was reviewed with the patient for accuracy of information. Patient denies taking  any other medications other than those listed and verified on medication profile.Patient was explained the Colonoscopy will be performed here at [facility]. Patient was further explained the Pre-Op will call one day prior to the procedure date, to discuss Pre-Op instructions;and what time to report for the Colonoscopy. The patient was given the opportunity to ask any questions about the Colonoscopy.       Bowel Prep/SUPREP instructions                                               [place]    [address] [place] [address]      You are scheduled for a Colonoscopy with _______________________ on  ____________________   At [facility] in [place].    Check in at the hospital on 1st floor Registration area next to Emergency room.    Please call [phone] to reschedule or if you have any questions.    An adult friend/family member must come with you to drive you home.  You cannot drive, take a taxi, Uber/Lyft or bus to leave the Hospital alone. If you do not have someone with you to drive you home, your test will be cancelled.       Please follow the directions of your doctor if you take any pills that thin your blood.  If you take these meds: Aggrenox, Brilinta, Effient, Eliquis, Lovenox, Plavix, Pletal Pradaxa ticilid, Xarelto, or Coumadin, let the doctor's office know.    Don't: On the morning of the test do not take insulin or pills for diabetes.   Do: On the morning of the test, do take any pills for blood pressure, heart, anti-rejection and or seizures with a small sip of water. Bring any inhalers with you day of procedure.    To have a good prep, you must follow these instructions- please do not use the directions from the pharmacy!      The doctor will send a prescription for the SUPREP   The day Before the test:   You can only drink CLEAR LIQUIDS the whole day before your test. You can't eat any food for the whole day.    You CAN have:   Water,Coffee or decaf coffee (no milk or cream)    Tea   Soft drinks- regular and sugar free   Jell-O (green or yellow)   Apple Juice, grape juice, white cranberry juice   Gatorade, Power Aid, Crystal Light, Lauri Aid   Lemonade and Limeade   Bouillon, clear soup   Snowball, popsicles   YOU CAN'T DRINK ANYTHING RED   YOU CAN'T DRINK ALCOHOL   ONLY DRINK WHAT IS ON THIS List      At 5pm the night before your test:   Pour the 1st bottle of SUPREP into the cup provided in the box.  Add water to the line on the cup and mix well. Drink the whole cup and then drink 2 more full cups of water over the 1 hour.    This can be easier to drink if it is cold.  You  can mix it 20 minutes ahead of time and place in the refrigerator before you drink it.  You must drink it within 30-45 minutes of mixing it. Do NOT pour the drink over ice. You can drink it with a straw.    The Day of the test- We will call you 1 day before your test to tell you what time to get there.    5 hours before you come to the hospital (this may be the middle of the night)   Pour the 2nd bottle of SUPREP into to the cup provided in the box. Add water to the line on the cup and mix well. Drink the whole cup and then drink 2 more full cups of water over 1 hour.    It might be easier to drink if it is cold. You can mix it 20 minutes ahead of time and place in the refrigerator before you drink it. You must drink it within 30-45 minutes of mixing it. Do NOT pour the drink over ice. You can drink it with a straw.   YOU CAN'T EAT OR DRINK ANYTHING ELSE ONCE YOU FINISH THE PREP.   Leave all valuables and jewelry at home. You will be at the hospital for 2-4 hours.    Call the Endoscopy Scheduling Department at [phone] with any questions about your procedure.    Please  your medication from your local pharmacy. If you are unable to  the SUPREP Kit please contact our office.   Thank you   Endo Scheduling Dept   [facility]

## 2022-11-01 ENCOUNTER — TELEPHONE (OUTPATIENT)
Dept: GASTROENTEROLOGY | Facility: CLINIC | Age: 72
End: 2022-11-01
Payer: COMMERCIAL

## 2022-11-01 NOTE — TELEPHONE ENCOUNTER
Patient notified and understands.      ----- Message from Jesse Brunson MD sent at 11/1/2022  2:20 PM CDT -----  Please inform:  Polyps were benign polyps called adenomatous polyp. This is a precancerous polyp. It does not contain cancer, but has the potential to turn into cancer over time. This polyp has been removed but you may develop more polyps in the future. It is important to keep your previously recommended interval for of repeat colonoscopy.- 5 years

## 2022-11-09 PROBLEM — K63.0 INTESTINAL DIVERTICULAR ABSCESS: Status: ACTIVE | Noted: 2022-11-09

## 2022-11-09 PROBLEM — N30.00 ACUTE CYSTITIS WITHOUT HEMATURIA: Status: ACTIVE | Noted: 2022-11-09

## 2022-11-14 ENCOUNTER — PATIENT OUTREACH (OUTPATIENT)
Dept: ADMINISTRATIVE | Facility: CLINIC | Age: 72
End: 2022-11-14
Payer: COMMERCIAL

## 2022-11-14 NOTE — PROGRESS NOTES
C3 nurse spoke with Vijay Nguyen for a TCC post hospital discharge follow up call. The patient has a scheduled HOSFU appointment with Carmella Pantoja at [facility] on 11/18/2022 @ 0900.

## 2023-01-19 PROBLEM — K57.20 COLONIC DIVERTICULAR ABSCESS: Status: ACTIVE | Noted: 2022-11-09

## 2023-01-20 ENCOUNTER — HOSPITAL ENCOUNTER (OUTPATIENT)
Dept: INTERVENTIONAL RADIOLOGY/VASCULAR | Facility: HOSPITAL | Age: 73
Discharge: HOME | End: 2023-01-20
Attending: RADIOLOGY
Payer: MEDICARE

## 2023-01-20 VITALS
SYSTOLIC BLOOD PRESSURE: 142 MMHG | RESPIRATION RATE: 13 BRPM | OXYGEN SATURATION: 98 % | DIASTOLIC BLOOD PRESSURE: 50 MMHG | HEART RATE: 64 BPM

## 2023-01-20 PROCEDURE — 25000003 PHARM REV CODE 250: Performed by: RADIOLOGY

## 2023-01-20 PROCEDURE — 87186 SC STD MICRODIL/AGAR DIL: CPT | Performed by: FAMILY MEDICINE

## 2023-01-20 PROCEDURE — 63600175 PHARM REV CODE 636 W HCPCS: Performed by: RADIOLOGY

## 2023-01-20 PROCEDURE — 87205 SMEAR GRAM STAIN: CPT | Performed by: FAMILY MEDICINE

## 2023-01-20 PROCEDURE — 49406 IMAGE CATH FLUID PERI/RETRO: CPT | Mod: TC

## 2023-01-20 PROCEDURE — 87102 FUNGUS ISOLATION CULTURE: CPT | Performed by: FAMILY MEDICINE

## 2023-01-20 PROCEDURE — 99153 MOD SED SAME PHYS/QHP EA: CPT

## 2023-01-20 PROCEDURE — 49406 IMAGE CATH FLUID PERI/RETRO: CPT | Mod: ,,, | Performed by: RADIOLOGY

## 2023-01-20 PROCEDURE — 87070 CULTURE OTHR SPECIMN AEROBIC: CPT | Performed by: FAMILY MEDICINE

## 2023-01-20 PROCEDURE — 49406 IR ABSCESS DRAINAGE WITH TUBE PLACEMENT: ICD-10-PCS | Mod: ,,, | Performed by: RADIOLOGY

## 2023-01-20 PROCEDURE — 99152 PR MOD CONSCIOUS SEDATION, SAME PHYS, 5+ YRS, FIRST 15 MIN: ICD-10-PCS | Mod: ,,, | Performed by: RADIOLOGY

## 2023-01-20 PROCEDURE — 87077 CULTURE AEROBIC IDENTIFY: CPT | Performed by: FAMILY MEDICINE

## 2023-01-20 PROCEDURE — 99152 MOD SED SAME PHYS/QHP 5/>YRS: CPT | Mod: ,,, | Performed by: RADIOLOGY

## 2023-01-20 PROCEDURE — 99152 MOD SED SAME PHYS/QHP 5/>YRS: CPT

## 2023-01-20 RX ORDER — LIDOCAINE HYDROCHLORIDE 10 MG/ML
INJECTION INFILTRATION; PERINEURAL
Status: COMPLETED | OUTPATIENT
Start: 2023-01-20 | End: 2023-01-20

## 2023-01-20 RX ORDER — FENTANYL CITRATE 50 UG/ML
INJECTION, SOLUTION INTRAMUSCULAR; INTRAVENOUS
Status: COMPLETED | OUTPATIENT
Start: 2023-01-20 | End: 2023-01-20

## 2023-01-20 RX ORDER — MIDAZOLAM HYDROCHLORIDE 1 MG/ML
INJECTION INTRAMUSCULAR; INTRAVENOUS
Status: COMPLETED | OUTPATIENT
Start: 2023-01-20 | End: 2023-01-20

## 2023-01-20 RX ADMIN — LIDOCAINE HYDROCHLORIDE 5 ML: 10 INJECTION, SOLUTION INFILTRATION; PERINEURAL at 02:01

## 2023-01-20 RX ADMIN — LIDOCAINE HYDROCHLORIDE 5 ML: 10 INJECTION, SOLUTION INFILTRATION; PERINEURAL at 01:01

## 2023-01-20 RX ADMIN — FENTANYL CITRATE 75 MCG: 50 INJECTION, SOLUTION INTRAMUSCULAR; INTRAVENOUS at 01:01

## 2023-01-20 RX ADMIN — MIDAZOLAM HYDROCHLORIDE 1 MG: 1 INJECTION, SOLUTION INTRAMUSCULAR; INTRAVENOUS at 01:01

## 2023-01-22 LAB
BACTERIA FLD AEROBE CULT: ABNORMAL
GRAM STN SPEC: ABNORMAL

## 2023-01-26 ENCOUNTER — PATIENT OUTREACH (OUTPATIENT)
Dept: ADMINISTRATIVE | Facility: CLINIC | Age: 73
End: 2023-01-26
Payer: MEDICARE

## 2023-01-26 NOTE — PROGRESS NOTES
C3 nurse spoke with Vijay Nguyen for a TCC post hospital discharge follow up call. The patient has a scheduled HOSFU appointment with Tania Arteaga MD on 02/01/23 @ [id number].

## 2023-01-27 ENCOUNTER — TELEPHONE (OUTPATIENT)
Dept: SURGERY | Facility: CLINIC | Age: 73
End: 2023-01-27
Payer: MEDICARE

## 2023-01-27 NOTE — TELEPHONE ENCOUNTER
Spoke with patient's daughter. She was concerned that she ran out of the saline syringes for irrigating the tube as she was directed. She was informed that she could pass at this location before the end of day to obtain some to last until the patient comes for his follow up visit. Verbalized understanding. No further issues discussed.

## 2023-01-27 NOTE — TELEPHONE ENCOUNTER
----- Message from Joaquín Yoder sent at 1/27/2023 10:17 AM CST -----  Regarding: call back  Pt's daughter Franci call to speak with someone in regards to pt care requesting call back    Call

## 2023-01-31 ENCOUNTER — OFFICE VISIT (OUTPATIENT)
Dept: SURGERY | Facility: CLINIC | Age: 73
End: 2023-01-31
Payer: MEDICARE

## 2023-01-31 VITALS
HEIGHT: 71 IN | BODY MASS INDEX: 25.12 KG/M2 | WEIGHT: 179.44 LBS | DIASTOLIC BLOOD PRESSURE: 81 MMHG | HEART RATE: 65 BPM | SYSTOLIC BLOOD PRESSURE: 138 MMHG

## 2023-01-31 DIAGNOSIS — K57.92 DIVERTICULITIS: Primary | ICD-10-CM

## 2023-01-31 PROCEDURE — 3079F PR MOST RECENT DIASTOLIC BLOOD PRESSURE 80-89 MM HG: ICD-10-PCS | Mod: CPTII,S$GLB,, | Performed by: SURGERY

## 2023-01-31 PROCEDURE — 4010F ACE/ARB THERAPY RXD/TAKEN: CPT | Mod: CPTII,S$GLB,, | Performed by: SURGERY

## 2023-01-31 PROCEDURE — 1125F AMNT PAIN NOTED PAIN PRSNT: CPT | Mod: CPTII,S$GLB,, | Performed by: SURGERY

## 2023-01-31 PROCEDURE — 4010F PR ACE/ARB THEARPY RXD/TAKEN: ICD-10-PCS | Mod: CPTII,S$GLB,, | Performed by: SURGERY

## 2023-01-31 PROCEDURE — 1125F PR PAIN SEVERITY QUANTIFIED, PAIN PRESENT: ICD-10-PCS | Mod: CPTII,S$GLB,, | Performed by: SURGERY

## 2023-01-31 PROCEDURE — 1111F DSCHRG MED/CURRENT MED MERGE: CPT | Mod: CPTII,S$GLB,, | Performed by: SURGERY

## 2023-01-31 PROCEDURE — 3008F PR BODY MASS INDEX (BMI) DOCUMENTED: ICD-10-PCS | Mod: CPTII,S$GLB,, | Performed by: SURGERY

## 2023-01-31 PROCEDURE — 1160F PR REVIEW ALL MEDS BY PRESCRIBER/CLIN PHARMACIST DOCUMENTED: ICD-10-PCS | Mod: CPTII,S$GLB,, | Performed by: SURGERY

## 2023-01-31 PROCEDURE — 1101F PR PT FALLS ASSESS DOC 0-1 FALLS W/OUT INJ PAST YR: ICD-10-PCS | Mod: CPTII,S$GLB,, | Performed by: SURGERY

## 2023-01-31 PROCEDURE — 99214 OFFICE O/P EST MOD 30 MIN: CPT | Mod: S$GLB,,, | Performed by: SURGERY

## 2023-01-31 PROCEDURE — 3075F PR MOST RECENT SYSTOLIC BLOOD PRESS GE 130-139MM HG: ICD-10-PCS | Mod: CPTII,S$GLB,, | Performed by: SURGERY

## 2023-01-31 PROCEDURE — 3008F BODY MASS INDEX DOCD: CPT | Mod: CPTII,S$GLB,, | Performed by: SURGERY

## 2023-01-31 PROCEDURE — 3075F SYST BP GE 130 - 139MM HG: CPT | Mod: CPTII,S$GLB,, | Performed by: SURGERY

## 2023-01-31 PROCEDURE — 99999 PR PBB SHADOW E&M-EST. PATIENT-LVL III: ICD-10-PCS | Mod: PBBFAC,,, | Performed by: SURGERY

## 2023-01-31 PROCEDURE — 99999 PR PBB SHADOW E&M-EST. PATIENT-LVL III: CPT | Mod: PBBFAC,,, | Performed by: SURGERY

## 2023-01-31 PROCEDURE — 1101F PT FALLS ASSESS-DOCD LE1/YR: CPT | Mod: CPTII,S$GLB,, | Performed by: SURGERY

## 2023-01-31 PROCEDURE — 1160F RVW MEDS BY RX/DR IN RCRD: CPT | Mod: CPTII,S$GLB,, | Performed by: SURGERY

## 2023-01-31 PROCEDURE — 1159F MED LIST DOCD IN RCRD: CPT | Mod: CPTII,S$GLB,, | Performed by: SURGERY

## 2023-01-31 PROCEDURE — 1159F PR MEDICATION LIST DOCUMENTED IN MEDICAL RECORD: ICD-10-PCS | Mod: CPTII,S$GLB,, | Performed by: SURGERY

## 2023-01-31 PROCEDURE — 3288F PR FALLS RISK ASSESSMENT DOCUMENTED: ICD-10-PCS | Mod: CPTII,S$GLB,, | Performed by: SURGERY

## 2023-01-31 PROCEDURE — 3288F FALL RISK ASSESSMENT DOCD: CPT | Mod: CPTII,S$GLB,, | Performed by: SURGERY

## 2023-01-31 PROCEDURE — 3079F DIAST BP 80-89 MM HG: CPT | Mod: CPTII,S$GLB,, | Performed by: SURGERY

## 2023-01-31 PROCEDURE — 1111F PR DISCHARGE MEDS RECONCILED W/ CURRENT OUTPATIENT MED LIST: ICD-10-PCS | Mod: CPTII,S$GLB,, | Performed by: SURGERY

## 2023-01-31 PROCEDURE — 99214 PR OFFICE/OUTPT VISIT, EST, LEVL IV, 30-39 MIN: ICD-10-PCS | Mod: S$GLB,,, | Performed by: SURGERY

## 2023-02-20 LAB — FUNGUS SPEC CULT: NORMAL

## 2024-12-11 ENCOUNTER — TELEPHONE (OUTPATIENT)
Dept: DIABETES | Facility: CLINIC | Age: 74
End: 2024-12-11
Payer: MEDICARE

## 2025-01-13 ENCOUNTER — TELEPHONE (OUTPATIENT)
Dept: DIABETES | Facility: CLINIC | Age: 75
End: 2025-01-13
Payer: MEDICARE

## 2025-01-15 ENCOUNTER — TELEPHONE (OUTPATIENT)
Dept: DIABETES | Facility: CLINIC | Age: 75
End: 2025-01-15
Payer: MEDICARE

## 2025-06-09 ENCOUNTER — OFFICE VISIT (OUTPATIENT)
Dept: PODIATRY | Facility: CLINIC | Age: 75
End: 2025-06-09
Payer: MEDICARE

## 2025-06-09 VITALS
BODY MASS INDEX: 28.38 KG/M2 | DIASTOLIC BLOOD PRESSURE: 76 MMHG | HEIGHT: 71 IN | WEIGHT: 202.69 LBS | SYSTOLIC BLOOD PRESSURE: 149 MMHG | TEMPERATURE: 64 F

## 2025-06-09 DIAGNOSIS — E11.42 DM TYPE 2 WITH DIABETIC PERIPHERAL NEUROPATHY: Primary | ICD-10-CM

## 2025-06-09 DIAGNOSIS — M12.572 TRAUMATIC ARTHROPATHY OF ANKLE AND FOOT, LEFT: ICD-10-CM

## 2025-06-09 DIAGNOSIS — M20.5X2 HALLUX LIMITUS, ACQUIRED, LEFT: ICD-10-CM

## 2025-06-09 DIAGNOSIS — R73.09 HEMOGLOBIN A1C GREATER THAN 9%, INDICATING POOR DIABETIC CONTROL: ICD-10-CM

## 2025-06-09 DIAGNOSIS — M79.89 PAIN AND SWELLING OF TOE OF LEFT FOOT: ICD-10-CM

## 2025-06-09 DIAGNOSIS — M79.675 PAIN AND SWELLING OF TOE OF LEFT FOOT: ICD-10-CM

## 2025-06-09 DIAGNOSIS — Z79.01 LONG TERM CURRENT USE OF ANTICOAGULANT: ICD-10-CM

## 2025-06-09 PROCEDURE — 1159F MED LIST DOCD IN RCRD: CPT | Mod: CPTII,S$GLB,, | Performed by: PODIATRIST

## 2025-06-09 PROCEDURE — 1101F PT FALLS ASSESS-DOCD LE1/YR: CPT | Mod: CPTII,S$GLB,, | Performed by: PODIATRIST

## 2025-06-09 PROCEDURE — 3078F DIAST BP <80 MM HG: CPT | Mod: CPTII,S$GLB,, | Performed by: PODIATRIST

## 2025-06-09 PROCEDURE — 99203 OFFICE O/P NEW LOW 30 MIN: CPT | Mod: S$GLB,,, | Performed by: PODIATRIST

## 2025-06-09 PROCEDURE — 99999 PR PBB SHADOW E&M-EST. PATIENT-LVL III: CPT | Mod: PBBFAC,,, | Performed by: PODIATRIST

## 2025-06-09 PROCEDURE — 1125F AMNT PAIN NOTED PAIN PRSNT: CPT | Mod: CPTII,S$GLB,, | Performed by: PODIATRIST

## 2025-06-09 PROCEDURE — 4010F ACE/ARB THERAPY RXD/TAKEN: CPT | Mod: CPTII,S$GLB,, | Performed by: PODIATRIST

## 2025-06-09 PROCEDURE — 3077F SYST BP >= 140 MM HG: CPT | Mod: CPTII,S$GLB,, | Performed by: PODIATRIST

## 2025-06-09 PROCEDURE — 3288F FALL RISK ASSESSMENT DOCD: CPT | Mod: CPTII,S$GLB,, | Performed by: PODIATRIST

## 2025-06-09 RX ORDER — GABAPENTIN 300 MG/1
300 CAPSULE ORAL 3 TIMES DAILY
Qty: 270 CAPSULE | Refills: 1 | Status: SHIPPED | OUTPATIENT
Start: 2025-06-09

## 2025-06-09 RX ORDER — DICLOFENAC SODIUM 10 MG/G
2 GEL TOPICAL 4 TIMES DAILY
Qty: 350 G | Refills: 3 | Status: SHIPPED | OUTPATIENT
Start: 2025-06-09

## 2025-06-09 RX ORDER — DOCUSATE SODIUM 100 MG/1
1 CAPSULE, LIQUID FILLED ORAL DAILY
COMMUNITY
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
## (undated) DEVICE — SOLUTION IRRI NS BOTTLE 1000ML R5200-01

## (undated) DEVICE — PAD BOVIE ADULT

## (undated) DEVICE — GLOVE BIOGEL PI GOLD SZ  8.5

## (undated) DEVICE — TUBING CYSTO DOUBLE 654301

## (undated) DEVICE — BLADE AGRESSIVE PLUS 4.0 375-544-000

## (undated) DEVICE — PACK ARTHROSCOPY SMHS009-07

## (undated) DEVICE — CUFF TOURNIQUET 34DUAL PRT 5921-034-235

## (undated) DEVICE — SPONGE GAUZE 10S 4X4  442214

## (undated) DEVICE — UNDERGLOVE BIOGEL MICRO BLUE SZ 8.5

## (undated) DEVICE — SOLUTION NACL 0.9% 3000ML